# Patient Record
Sex: FEMALE | Race: WHITE | NOT HISPANIC OR LATINO | Employment: PART TIME | URBAN - METROPOLITAN AREA
[De-identification: names, ages, dates, MRNs, and addresses within clinical notes are randomized per-mention and may not be internally consistent; named-entity substitution may affect disease eponyms.]

---

## 2019-01-11 ENCOUNTER — HOSPITAL ENCOUNTER (OUTPATIENT)
Dept: GENERAL RADIOLOGY | Facility: HOSPITAL | Age: 74
Discharge: HOME | End: 2019-01-11
Attending: FAMILY MEDICINE

## 2019-03-13 ENCOUNTER — HOSPITAL ENCOUNTER (OUTPATIENT)
Dept: LAB | Facility: HOSPITAL | Age: 74
Discharge: HOME | End: 2019-03-13

## 2019-11-26 ENCOUNTER — OFFICE VISIT CONVERTED (OUTPATIENT)
Dept: FAMILY MEDICINE CLINIC | Facility: CLINIC | Age: 74
End: 2019-11-26
Attending: NURSE PRACTITIONER

## 2019-11-26 ENCOUNTER — OFFICE VISIT CONVERTED (OUTPATIENT)
Dept: GASTROENTEROLOGY | Facility: CLINIC | Age: 74
End: 2019-11-26
Attending: NURSE PRACTITIONER

## 2020-01-06 ENCOUNTER — OFFICE VISIT CONVERTED (OUTPATIENT)
Dept: SURGERY | Facility: CLINIC | Age: 75
End: 2020-01-06
Attending: SURGERY

## 2020-05-12 ENCOUNTER — OFFICE VISIT CONVERTED (OUTPATIENT)
Dept: FAMILY MEDICINE CLINIC | Facility: CLINIC | Age: 75
End: 2020-05-12
Attending: NURSE PRACTITIONER

## 2020-05-22 ENCOUNTER — HOSPITAL ENCOUNTER (OUTPATIENT)
Dept: NUCLEAR MEDICINE | Facility: HOSPITAL | Age: 75
Discharge: HOME | End: 2020-05-22
Attending: NURSE PRACTITIONER

## 2020-05-22 ENCOUNTER — HOSPITAL ENCOUNTER (OUTPATIENT)
Dept: LAB | Facility: HOSPITAL | Age: 75
Discharge: HOME | End: 2020-05-22
Attending: NURSE PRACTITIONER

## 2020-05-22 LAB
ALBUMIN SERPL-MCNC: 4.3 G/DL (ref 3.5–5)
ALBUMIN/GLOB SERPL: 1.3 {RATIO} (ref 1.4–2.6)
ALP SERPL-CCNC: 71 U/L (ref 43–160)
ALT SERPL-CCNC: 15 U/L (ref 10–40)
ANION GAP SERPL CALC-SCNC: 20 MMOL/L (ref 8–19)
APPEARANCE UR: ABNORMAL
AST SERPL-CCNC: 16 U/L (ref 15–50)
BASOPHILS # BLD AUTO: 0.03 10*3/UL (ref 0–0.2)
BASOPHILS NFR BLD AUTO: 0.3 % (ref 0–3)
BILIRUB SERPL-MCNC: 0.24 MG/DL (ref 0.2–1.3)
BILIRUB UR QL: NEGATIVE
BUN SERPL-MCNC: 18 MG/DL (ref 5–25)
BUN/CREAT SERPL: 18 {RATIO} (ref 6–20)
CALCIUM SERPL-MCNC: 10.3 MG/DL (ref 8.7–10.4)
CHLORIDE SERPL-SCNC: 104 MMOL/L (ref 99–111)
CHOLEST SERPL-MCNC: 152 MG/DL (ref 107–200)
CHOLEST/HDLC SERPL: 2.8 {RATIO} (ref 3–6)
COLOR UR: YELLOW
CONV ABS IMM GRAN: 0.03 10*3/UL (ref 0–0.2)
CONV BACTERIA: ABNORMAL
CONV CO2: 25 MMOL/L (ref 22–32)
CONV COLLECTION SOURCE (UA): ABNORMAL
CONV IMMATURE GRAN: 0.3 % (ref 0–1.8)
CONV TOTAL PROTEIN: 7.5 G/DL (ref 6.3–8.2)
CONV UROBILINOGEN IN URINE BY AUTOMATED TEST STRIP: 0.2 {EHRLICHU}/DL (ref 0.1–1)
CREAT UR-MCNC: 1.01 MG/DL (ref 0.5–0.9)
DEPRECATED RDW RBC AUTO: 48.3 FL (ref 36.4–46.3)
EOSINOPHIL # BLD AUTO: 0.2 10*3/UL (ref 0–0.7)
EOSINOPHIL # BLD AUTO: 1.7 % (ref 0–7)
ERYTHROCYTE [DISTWIDTH] IN BLOOD BY AUTOMATED COUNT: 14.3 % (ref 11.7–14.4)
EST. AVERAGE GLUCOSE BLD GHB EST-MCNC: 126 MG/DL
GFR SERPLBLD BASED ON 1.73 SQ M-ARVRAT: 55 ML/MIN/{1.73_M2}
GLOBULIN UR ELPH-MCNC: 3.2 G/DL (ref 2–3.5)
GLUCOSE SERPL-MCNC: 125 MG/DL (ref 65–99)
GLUCOSE UR QL: NEGATIVE MG/DL
HBA1C MFR BLD: 6 % (ref 3.5–5.7)
HCT VFR BLD AUTO: 42.3 % (ref 37–47)
HDLC SERPL-MCNC: 55 MG/DL (ref 40–60)
HGB BLD-MCNC: 12.9 G/DL (ref 12–16)
HGB UR QL STRIP: ABNORMAL
KETONES UR QL STRIP: NEGATIVE MG/DL
LDLC SERPL CALC-MCNC: 79 MG/DL (ref 70–100)
LEUKOCYTE ESTERASE UR QL STRIP: ABNORMAL
LYMPHOCYTES # BLD AUTO: 3.33 10*3/UL (ref 1–5)
LYMPHOCYTES NFR BLD AUTO: 28.4 % (ref 20–45)
MCH RBC QN AUTO: 27.9 PG (ref 27–31)
MCHC RBC AUTO-ENTMCNC: 30.5 G/DL (ref 33–37)
MCV RBC AUTO: 91.4 FL (ref 81–99)
MONOCYTES # BLD AUTO: 0.67 10*3/UL (ref 0.2–1.2)
MONOCYTES NFR BLD AUTO: 5.7 % (ref 3–10)
NEUTROPHILS # BLD AUTO: 7.45 10*3/UL (ref 2–8)
NEUTROPHILS NFR BLD AUTO: 63.6 % (ref 30–85)
NITRITE UR QL STRIP: NEGATIVE
NRBC CBCN: 0 % (ref 0–0.7)
OSMOLALITY SERPL CALC.SUM OF ELEC: 301 MOSM/KG (ref 273–304)
PH UR STRIP.AUTO: 5 [PH] (ref 5–8)
PLATELET # BLD AUTO: 164 10*3/UL (ref 130–400)
PMV BLD AUTO: 13.1 FL (ref 9.4–12.3)
POTASSIUM SERPL-SCNC: 4.7 MMOL/L (ref 3.5–5.3)
PROT UR QL: 100 MG/DL
RBC # BLD AUTO: 4.63 10*6/UL (ref 4.2–5.4)
RBC #/AREA URNS HPF: ABNORMAL /[HPF]
SODIUM SERPL-SCNC: 144 MMOL/L (ref 135–147)
SP GR UR: 1.02 (ref 1–1.03)
T4 FREE SERPL-MCNC: 1.7 NG/DL (ref 0.9–1.8)
TRIGL SERPL-MCNC: 89 MG/DL (ref 40–150)
TSH SERPL-ACNC: 0.95 M[IU]/L (ref 0.27–4.2)
VLDLC SERPL-MCNC: 18 MG/DL (ref 5–37)
WBC # BLD AUTO: 11.71 10*3/UL (ref 4.8–10.8)
WBC #/AREA URNS HPF: ABNORMAL /[HPF]

## 2020-05-25 LAB
AMOXICILLIN+CLAV SUSC ISLT: 4
AMPICILLIN SUSC ISLT: >=32
AMPICILLIN+SULBAC SUSC ISLT: >=32
BACTERIA UR CULT: ABNORMAL
CEFAZOLIN SUSC ISLT: <=4
CEFEPIME SUSC ISLT: <=1
CEFTAZIDIME SUSC ISLT: <=1
CEFTRIAXONE SUSC ISLT: <=1
CEFUROXIME ORAL SUSC ISLT: 4
CEFUROXIME PARENTER SUSC ISLT: 4
CIPROFLOXACIN SUSC ISLT: 0.5
ERTAPENEM SUSC ISLT: <=0.5
GENTAMICIN SUSC ISLT: <=1
LEVOFLOXACIN SUSC ISLT: 1
NITROFURANTOIN SUSC ISLT: <=16
TETRACYCLINE SUSC ISLT: <=1
TMP SMX SUSC ISLT: <=20
TOBRAMYCIN SUSC ISLT: <=1

## 2020-06-15 ENCOUNTER — HOSPITAL ENCOUNTER (OUTPATIENT)
Dept: OTHER | Facility: HOSPITAL | Age: 75
Discharge: HOME | End: 2020-06-15
Attending: OBSTETRICS & GYNECOLOGY

## 2020-06-25 ENCOUNTER — HOSPITAL ENCOUNTER (OUTPATIENT)
Dept: LAB | Facility: HOSPITAL | Age: 75
Discharge: HOME | End: 2020-06-25
Attending: NURSE PRACTITIONER

## 2020-06-25 LAB
ALBUMIN SERPL-MCNC: 4.7 G/DL (ref 3.5–5)
ALBUMIN/GLOB SERPL: 1.7 {RATIO} (ref 1.4–2.6)
ALP SERPL-CCNC: 68 U/L (ref 43–160)
ALT SERPL-CCNC: 21 U/L (ref 10–40)
ANION GAP SERPL CALC-SCNC: 23 MMOL/L (ref 8–19)
AST SERPL-CCNC: 19 U/L (ref 15–50)
BASOPHILS # BLD AUTO: 0.03 10*3/UL (ref 0–0.2)
BASOPHILS NFR BLD AUTO: 0.4 % (ref 0–3)
BILIRUB SERPL-MCNC: 0.24 MG/DL (ref 0.2–1.3)
BUN SERPL-MCNC: 17 MG/DL (ref 5–25)
BUN/CREAT SERPL: 18 {RATIO} (ref 6–20)
CALCIUM SERPL-MCNC: 10.5 MG/DL (ref 8.7–10.4)
CHLORIDE SERPL-SCNC: 106 MMOL/L (ref 99–111)
CONV ABS IMM GRAN: 0.02 10*3/UL (ref 0–0.2)
CONV CO2: 22 MMOL/L (ref 22–32)
CONV IMMATURE GRAN: 0.2 % (ref 0–1.8)
CONV TOTAL PROTEIN: 7.5 G/DL (ref 6.3–8.2)
CREAT UR-MCNC: 0.93 MG/DL (ref 0.5–0.9)
DEPRECATED RDW RBC AUTO: 47.3 FL (ref 36.4–46.3)
EOSINOPHIL # BLD AUTO: 0.2 10*3/UL (ref 0–0.7)
EOSINOPHIL # BLD AUTO: 2.5 % (ref 0–7)
ERYTHROCYTE [DISTWIDTH] IN BLOOD BY AUTOMATED COUNT: 14.1 % (ref 11.7–14.4)
GFR SERPLBLD BASED ON 1.73 SQ M-ARVRAT: >60 ML/MIN/{1.73_M2}
GLOBULIN UR ELPH-MCNC: 2.8 G/DL (ref 2–3.5)
GLUCOSE SERPL-MCNC: 118 MG/DL (ref 65–99)
HCT VFR BLD AUTO: 43.1 % (ref 37–47)
HGB BLD-MCNC: 13.2 G/DL (ref 12–16)
LYMPHOCYTES # BLD AUTO: 3.65 10*3/UL (ref 1–5)
LYMPHOCYTES NFR BLD AUTO: 45.4 % (ref 20–45)
MCH RBC QN AUTO: 28 PG (ref 27–31)
MCHC RBC AUTO-ENTMCNC: 30.6 G/DL (ref 33–37)
MCV RBC AUTO: 91.3 FL (ref 81–99)
MONOCYTES # BLD AUTO: 0.46 10*3/UL (ref 0.2–1.2)
MONOCYTES NFR BLD AUTO: 5.7 % (ref 3–10)
NEUTROPHILS # BLD AUTO: 3.68 10*3/UL (ref 2–8)
NEUTROPHILS NFR BLD AUTO: 45.8 % (ref 30–85)
NRBC CBCN: 0 % (ref 0–0.7)
OSMOLALITY SERPL CALC.SUM OF ELEC: 305 MOSM/KG (ref 273–304)
PLATELET # BLD AUTO: 183 10*3/UL (ref 130–400)
PMV BLD AUTO: 12.7 FL (ref 9.4–12.3)
POTASSIUM SERPL-SCNC: 4.6 MMOL/L (ref 3.5–5.3)
RBC # BLD AUTO: 4.72 10*6/UL (ref 4.2–5.4)
SODIUM SERPL-SCNC: 146 MMOL/L (ref 135–147)
WBC # BLD AUTO: 8.04 10*3/UL (ref 4.8–10.8)

## 2020-06-26 LAB
EST. AVERAGE GLUCOSE BLD GHB EST-MCNC: 134 MG/DL
HBA1C MFR BLD: 6.3 % (ref 3.5–5.7)

## 2020-06-27 LAB — BACTERIA UR CULT: NORMAL

## 2020-10-29 ENCOUNTER — OFFICE VISIT CONVERTED (OUTPATIENT)
Dept: ORTHOPEDIC SURGERY | Facility: CLINIC | Age: 75
End: 2020-10-29
Attending: ORTHOPAEDIC SURGERY

## 2020-10-30 ENCOUNTER — HOSPITAL ENCOUNTER (OUTPATIENT)
Dept: LAB | Facility: HOSPITAL | Age: 75
Discharge: HOME | End: 2020-10-30
Attending: NURSE PRACTITIONER

## 2020-10-30 LAB
ALBUMIN SERPL-MCNC: 4.7 G/DL (ref 3.5–5)
ALBUMIN/GLOB SERPL: 1.5 {RATIO} (ref 1.4–2.6)
ALP SERPL-CCNC: 77 U/L (ref 43–160)
ALT SERPL-CCNC: 22 U/L (ref 10–40)
ANION GAP SERPL CALC-SCNC: 16 MMOL/L (ref 8–19)
APPEARANCE UR: CLEAR
AST SERPL-CCNC: 18 U/L (ref 15–50)
BASOPHILS # BLD AUTO: 0.03 10*3/UL (ref 0–0.2)
BASOPHILS NFR BLD AUTO: 0.2 % (ref 0–3)
BILIRUB SERPL-MCNC: 0.22 MG/DL (ref 0.2–1.3)
BILIRUB UR QL: NEGATIVE
BUN SERPL-MCNC: 23 MG/DL (ref 5–25)
BUN/CREAT SERPL: 27 {RATIO} (ref 6–20)
CALCIUM SERPL-MCNC: 10.6 MG/DL (ref 8.7–10.4)
CHLORIDE SERPL-SCNC: 103 MMOL/L (ref 99–111)
CHOLEST SERPL-MCNC: 197 MG/DL (ref 107–200)
CHOLEST/HDLC SERPL: 2.7 {RATIO} (ref 3–6)
COLOR UR: YELLOW
CONV ABS IMM GRAN: 0.11 10*3/UL (ref 0–0.2)
CONV BACTERIA: NEGATIVE
CONV CO2: 26 MMOL/L (ref 22–32)
CONV COLLECTION SOURCE (UA): ABNORMAL
CONV HYALINE CASTS IN URINE MICRO: ABNORMAL /[LPF]
CONV IMMATURE GRAN: 0.6 % (ref 0–1.8)
CONV TOTAL PROTEIN: 7.8 G/DL (ref 6.3–8.2)
CONV UROBILINOGEN IN URINE BY AUTOMATED TEST STRIP: 0.2 {EHRLICHU}/DL (ref 0.1–1)
CREAT UR-MCNC: 0.84 MG/DL (ref 0.5–0.9)
DEPRECATED RDW RBC AUTO: 46.6 FL (ref 36.4–46.3)
EOSINOPHIL # BLD AUTO: 0.02 10*3/UL (ref 0–0.7)
EOSINOPHIL # BLD AUTO: 0.1 % (ref 0–7)
ERYTHROCYTE [DISTWIDTH] IN BLOOD BY AUTOMATED COUNT: 14.1 % (ref 11.7–14.4)
EST. AVERAGE GLUCOSE BLD GHB EST-MCNC: 131 MG/DL
GFR SERPLBLD BASED ON 1.73 SQ M-ARVRAT: >60 ML/MIN/{1.73_M2}
GLOBULIN UR ELPH-MCNC: 3.1 G/DL (ref 2–3.5)
GLUCOSE SERPL-MCNC: 124 MG/DL (ref 65–99)
GLUCOSE UR QL: NEGATIVE MG/DL
HBA1C MFR BLD: 6.2 % (ref 3.5–5.7)
HCT VFR BLD AUTO: 44.2 % (ref 37–47)
HDLC SERPL-MCNC: 72 MG/DL (ref 40–60)
HGB BLD-MCNC: 13.8 G/DL (ref 12–16)
HGB UR QL STRIP: NEGATIVE
KETONES UR QL STRIP: NEGATIVE MG/DL
LDLC SERPL CALC-MCNC: 112 MG/DL (ref 70–100)
LEUKOCYTE ESTERASE UR QL STRIP: ABNORMAL
LYMPHOCYTES # BLD AUTO: 2.25 10*3/UL (ref 1–5)
LYMPHOCYTES NFR BLD AUTO: 13.2 % (ref 20–45)
MCH RBC QN AUTO: 28.2 PG (ref 27–31)
MCHC RBC AUTO-ENTMCNC: 31.2 G/DL (ref 33–37)
MCV RBC AUTO: 90.2 FL (ref 81–99)
MONOCYTES # BLD AUTO: 0.55 10*3/UL (ref 0.2–1.2)
MONOCYTES NFR BLD AUTO: 3.2 % (ref 3–10)
NEUTROPHILS # BLD AUTO: 14.05 10*3/UL (ref 2–8)
NEUTROPHILS NFR BLD AUTO: 82.7 % (ref 30–85)
NITRITE UR QL STRIP: NEGATIVE
NRBC CBCN: 0 % (ref 0–0.7)
OSMOLALITY SERPL CALC.SUM OF ELEC: 295 MOSM/KG (ref 273–304)
PH UR STRIP.AUTO: 5.5 [PH] (ref 5–8)
PLATELET # BLD AUTO: 197 10*3/UL (ref 130–400)
PMV BLD AUTO: 11.9 FL (ref 9.4–12.3)
POTASSIUM SERPL-SCNC: 4.9 MMOL/L (ref 3.5–5.3)
PROT UR QL: NEGATIVE MG/DL
RBC # BLD AUTO: 4.9 10*6/UL (ref 4.2–5.4)
RBC #/AREA URNS HPF: ABNORMAL /[HPF]
SODIUM SERPL-SCNC: 140 MMOL/L (ref 135–147)
SP GR UR: 1.02 (ref 1–1.03)
SQUAMOUS SPT QL MICRO: ABNORMAL /[HPF]
TRIGL SERPL-MCNC: 66 MG/DL (ref 40–150)
VLDLC SERPL-MCNC: 13 MG/DL (ref 5–37)
WBC # BLD AUTO: 17.01 10*3/UL (ref 4.8–10.8)
WBC #/AREA URNS HPF: ABNORMAL /[HPF]

## 2020-11-13 ENCOUNTER — OFFICE VISIT CONVERTED (OUTPATIENT)
Dept: FAMILY MEDICINE CLINIC | Facility: CLINIC | Age: 75
End: 2020-11-13
Attending: NURSE PRACTITIONER

## 2020-12-23 ENCOUNTER — HOSPITAL ENCOUNTER (OUTPATIENT)
Dept: LAB | Facility: HOSPITAL | Age: 75
Discharge: HOME | End: 2020-12-23
Attending: NURSE PRACTITIONER

## 2020-12-23 LAB
BASOPHILS # BLD AUTO: 0.03 10*3/UL (ref 0–0.2)
BASOPHILS NFR BLD AUTO: 0.3 % (ref 0–3)
CONV ABS IMM GRAN: 0.03 10*3/UL (ref 0–0.2)
CONV IMMATURE GRAN: 0.3 % (ref 0–1.8)
DEPRECATED RDW RBC AUTO: 47.5 FL (ref 36.4–46.3)
EOSINOPHIL # BLD AUTO: 0.17 10*3/UL (ref 0–0.7)
EOSINOPHIL # BLD AUTO: 1.7 % (ref 0–7)
ERYTHROCYTE [DISTWIDTH] IN BLOOD BY AUTOMATED COUNT: 14.2 % (ref 11.7–14.4)
HCT VFR BLD AUTO: 44.2 % (ref 37–47)
HGB BLD-MCNC: 13.7 G/DL (ref 12–16)
LYMPHOCYTES # BLD AUTO: 3.09 10*3/UL (ref 1–5)
LYMPHOCYTES NFR BLD AUTO: 31.4 % (ref 20–45)
MCH RBC QN AUTO: 28 PG (ref 27–31)
MCHC RBC AUTO-ENTMCNC: 31 G/DL (ref 33–37)
MCV RBC AUTO: 90.4 FL (ref 81–99)
MONOCYTES # BLD AUTO: 0.54 10*3/UL (ref 0.2–1.2)
MONOCYTES NFR BLD AUTO: 5.5 % (ref 3–10)
NEUTROPHILS # BLD AUTO: 5.99 10*3/UL (ref 2–8)
NEUTROPHILS NFR BLD AUTO: 60.8 % (ref 30–85)
NRBC CBCN: 0 % (ref 0–0.7)
PLATELET # BLD AUTO: 206 10*3/UL (ref 130–400)
PMV BLD AUTO: 12 FL (ref 9.4–12.3)
RBC # BLD AUTO: 4.89 10*6/UL (ref 4.2–5.4)
WBC # BLD AUTO: 9.85 10*3/UL (ref 4.8–10.8)

## 2020-12-28 ENCOUNTER — HOSPITAL ENCOUNTER (OUTPATIENT)
Dept: LAB | Facility: HOSPITAL | Age: 75
Discharge: HOME | End: 2020-12-28
Attending: NURSE PRACTITIONER

## 2020-12-28 LAB
AMYLASE SERPL-CCNC: 54 U/L (ref 30–150)
LIPASE SERPL-CCNC: 24 U/L (ref 5–51)

## 2021-02-03 ENCOUNTER — HOSPITAL ENCOUNTER (OUTPATIENT)
Dept: VACCINE CLINIC | Facility: HOSPITAL | Age: 76
Discharge: HOME | End: 2021-02-03
Attending: INTERNAL MEDICINE

## 2021-02-23 ENCOUNTER — OFFICE VISIT CONVERTED (OUTPATIENT)
Dept: GASTROENTEROLOGY | Facility: CLINIC | Age: 76
End: 2021-02-23
Attending: NURSE PRACTITIONER

## 2021-02-23 ENCOUNTER — CONVERSION ENCOUNTER (OUTPATIENT)
Dept: GASTROENTEROLOGY | Facility: CLINIC | Age: 76
End: 2021-02-23

## 2021-03-05 ENCOUNTER — HOSPITAL ENCOUNTER (OUTPATIENT)
Dept: VACCINE CLINIC | Facility: HOSPITAL | Age: 76
Discharge: HOME | End: 2021-03-05
Attending: INTERNAL MEDICINE

## 2021-03-08 ENCOUNTER — HOSPITAL ENCOUNTER (OUTPATIENT)
Dept: NUCLEAR MEDICINE | Facility: HOSPITAL | Age: 76
Discharge: HOME | End: 2021-03-08
Attending: NURSE PRACTITIONER

## 2021-04-30 ENCOUNTER — HOSPITAL ENCOUNTER (OUTPATIENT)
Dept: LAB | Facility: HOSPITAL | Age: 76
Discharge: HOME | End: 2021-04-30
Attending: NURSE PRACTITIONER

## 2021-04-30 ENCOUNTER — OFFICE VISIT CONVERTED (OUTPATIENT)
Dept: FAMILY MEDICINE CLINIC | Facility: CLINIC | Age: 76
End: 2021-04-30
Attending: NURSE PRACTITIONER

## 2021-04-30 LAB
ALBUMIN SERPL-MCNC: 4.5 G/DL (ref 3.5–5)
ALBUMIN/GLOB SERPL: 1.4 {RATIO} (ref 1.4–2.6)
ALP SERPL-CCNC: 65 U/L (ref 43–160)
ALT SERPL-CCNC: 23 U/L (ref 10–40)
AMYLASE SERPL-CCNC: 60 U/L (ref 30–150)
ANION GAP SERPL CALC-SCNC: 20 MMOL/L (ref 8–19)
APPEARANCE UR: CLEAR
AST SERPL-CCNC: 20 U/L (ref 15–50)
BASOPHILS # BLD AUTO: 0.03 10*3/UL (ref 0–0.2)
BASOPHILS NFR BLD AUTO: 0.3 % (ref 0–3)
BILIRUB SERPL-MCNC: 0.33 MG/DL (ref 0.2–1.3)
BILIRUB UR QL: NEGATIVE
BUN SERPL-MCNC: 20 MG/DL (ref 5–25)
BUN/CREAT SERPL: 16 {RATIO} (ref 6–20)
CALCIUM SERPL-MCNC: 11 MG/DL (ref 8.7–10.4)
CHLORIDE SERPL-SCNC: 103 MMOL/L (ref 99–111)
CHOLEST SERPL-MCNC: 172 MG/DL (ref 107–200)
CHOLEST/HDLC SERPL: 2.9 {RATIO} (ref 3–6)
COLOR UR: YELLOW
CONV ABS IMM GRAN: 0.04 10*3/UL (ref 0–0.2)
CONV CO2: 24 MMOL/L (ref 22–32)
CONV COLLECTION SOURCE (UA): ABNORMAL
CONV HYALINE CASTS IN URINE MICRO: ABNORMAL /[LPF]
CONV IMMATURE GRAN: 0.4 % (ref 0–1.8)
CONV TOTAL PROTEIN: 7.8 G/DL (ref 6.3–8.2)
CONV UROBILINOGEN IN URINE BY AUTOMATED TEST STRIP: 0.2 {EHRLICHU}/DL (ref 0.1–1)
CREAT UR-MCNC: 1.26 MG/DL (ref 0.5–0.9)
DEPRECATED RDW RBC AUTO: 48 FL (ref 36.4–46.3)
EOSINOPHIL # BLD AUTO: 0.21 10*3/UL (ref 0–0.7)
EOSINOPHIL # BLD AUTO: 1.9 % (ref 0–7)
ERYTHROCYTE [DISTWIDTH] IN BLOOD BY AUTOMATED COUNT: 14.4 % (ref 11.7–14.4)
GFR SERPLBLD BASED ON 1.73 SQ M-ARVRAT: 42 ML/MIN/{1.73_M2}
GLOBULIN UR ELPH-MCNC: 3.3 G/DL (ref 2–3.5)
GLUCOSE SERPL-MCNC: 122 MG/DL (ref 65–99)
GLUCOSE UR QL: NEGATIVE MG/DL
HCT VFR BLD AUTO: 40.7 % (ref 37–47)
HDLC SERPL-MCNC: 59 MG/DL (ref 40–60)
HGB BLD-MCNC: 12.6 G/DL (ref 12–16)
HGB UR QL STRIP: NEGATIVE
KETONES UR QL STRIP: ABNORMAL MG/DL
LDLC SERPL CALC-MCNC: 94 MG/DL (ref 70–100)
LEUKOCYTE ESTERASE UR QL STRIP: ABNORMAL
LIPASE SERPL-CCNC: 22 U/L (ref 5–51)
LYMPHOCYTES # BLD AUTO: 2.86 10*3/UL (ref 1–5)
LYMPHOCYTES NFR BLD AUTO: 25.4 % (ref 20–45)
MCH RBC QN AUTO: 28.1 PG (ref 27–31)
MCHC RBC AUTO-ENTMCNC: 31 G/DL (ref 33–37)
MCV RBC AUTO: 90.6 FL (ref 81–99)
MONOCYTES # BLD AUTO: 0.76 10*3/UL (ref 0.2–1.2)
MONOCYTES NFR BLD AUTO: 6.8 % (ref 3–10)
NEUTROPHILS # BLD AUTO: 7.35 10*3/UL (ref 2–8)
NEUTROPHILS NFR BLD AUTO: 65.2 % (ref 30–85)
NITRITE UR QL STRIP: NEGATIVE
NRBC CBCN: 0 % (ref 0–0.7)
OSMOLALITY SERPL CALC.SUM OF ELEC: 296 MOSM/KG (ref 273–304)
PH UR STRIP.AUTO: 5.5 [PH] (ref 5–8)
PLATELET # BLD AUTO: 213 10*3/UL (ref 130–400)
PMV BLD AUTO: 12.6 FL (ref 9.4–12.3)
POTASSIUM SERPL-SCNC: 5.5 MMOL/L (ref 3.5–5.3)
PROT UR QL: ABNORMAL MG/DL
RBC # BLD AUTO: 4.49 10*6/UL (ref 4.2–5.4)
RBC #/AREA URNS HPF: ABNORMAL /[HPF]
SODIUM SERPL-SCNC: 141 MMOL/L (ref 135–147)
SP GR UR: 1.02 (ref 1–1.03)
SQUAMOUS SPT QL MICRO: ABNORMAL /[HPF]
T4 FREE SERPL-MCNC: 1.6 NG/DL (ref 0.9–1.8)
TRIGL SERPL-MCNC: 95 MG/DL (ref 40–150)
TSH SERPL-ACNC: 0.63 M[IU]/L (ref 0.27–4.2)
VLDLC SERPL-MCNC: 19 MG/DL (ref 5–37)
WBC # BLD AUTO: 11.25 10*3/UL (ref 4.8–10.8)
WBC #/AREA URNS HPF: ABNORMAL /[HPF]

## 2021-05-10 NOTE — H&P
History and Physical      Patient Name: Bhakti Travis   Patient ID: [id number]   Sex: Female   YOB: 1945    Primary Care Provider: Zaid AUSTIN   Referring Provider: Lissy AUSTIN    Visit Date: October 29, 2020    Provider: Yvonne Davison MD   Location: [facility]   Location Address: [address] [place] [address]  [id number]   Location Phone: [phone]          Chief Complaint  · Left shoulder pain      History Of Present Illness  Bhakti Travis is a 75 year old /White female who presents today to [facility].      Patient presents today with a chief complaint of left shoulder pain. Patient has had shoulder pain on and off but lately the pain has increasingly got worse and more consistent. Patient denies any trauma or injury to her shoulder. Patient works as a dialysis nurse.       Past Medical History  Essential hypertension; High blood pressure; High cholesterol; Hypothyroidism; Kidney stones; Pancreatitis         Past Surgical History  Appendectomy         Medication List  amlodipine 5 mg oral tablet; cefdinir 300 mg oral capsule; lisinopril 20 mg oral tablet; Protonix 40 mg oral tablet,delayed release (DR/EC); simvastatin 20 mg oral tablet; Synthroid 100 mcg oral tablet; Voltaren 1 % topical gel         Allergy List  NO KNOWN DRUG ALLERGIES       Allergies Reconciled  Family Medical History  Family history of breast cancer         Social History  Alcohol (Never); lives alone; Retired; Single; Tobacco (Former)         Immunizations  Name Date Admin   Influenza 10/01/2019   Zifaqplju05 10/01/2016   Prevnar 13 10/01/2017         Review of Systems  · Constitutional  o Denies  o : fever, chills, weight loss  · Cardiovascular  o Denies  o : chest pain, shortness of breath  · Gastrointestinal  o Denies  o : liver disease, heartburn, nausea, blood in stools  · Genitourinary  o Denies  o : painful urination, blood in  "urine  · Integument  o Denies  o : rash, itching  · Neurologic  o Denies  o : headache, weakness, loss of consciousness  · Musculoskeletal  o Denies  o : painful, swollen joints  · Psychiatric  o Denies  o : drug/alcohol addiction, anxiety, depression      Vitals  Date Time BP Position Site L\R Cuff Size HR RR TEMP (F) WT  HT  BMI kg/m2 BSA m2 O2 Sat FR L/min FiO2        10/29/2020 09:44 AM      82 - R   202lbs 0oz 5'  4\" 34.67 2.03 96 %            Physical Examination  · Constitutional  o Appearance  o : well developed, well-nourished, no obvious deformities present  · Head and Face  o Head  o :   § Inspection  § : normocephalic  o Face  o :   § Inspection  § : no facial lesions  · Eyes  o Conjunctivae  o : conjunctivae normal  o Sclerae  o : sclerae white  · Ears, Nose, Mouth and Throat  o Ears  o :   § External Ears  § : appearance within normal limits  § Hearing  § : intact  o Nose  o :   § External Nose  § : appearance normal  · Neck  o Inspection/Palpation  o : normal appearance  o Range of Motion  o : full range of motion  · Respiratory  o Respiratory Effort  o : breathing unlabored  o Inspection of Chest  o : normal appearance  o Auscultation of Lungs  o : no audible wheezing or rales  · Cardiovascular  o Heart  o : regular rate  · Gastrointestinal  o Abdominal Examination  o : soft and non-tender  · Skin and Subcutaneous Tissue  o General Inspection  o : intact, no rashes  · Psychiatric  o General  o : Alert and oriented x3  o Judgement and Insight  o : judgment and insight intact  o Mood and Affect  o : mood normal, affect appropriate  · Left Shoulder  o Inspection  o : Sensation grossly intact. Neurovascular intact. Skin intact. No swelling, skin discoloration or atrophy. Positive impingement test. Good tone of deltoid, biceps, triceps, wrist extensors, and wrist flexors. Full ROM. Tender to palpation.   · Injection Note/Aspiration Note  o Site  o : left shoulder   o Procedure  o : Procedure: After " educating the patient, patient gave consent for procedure. After using Chloraprep, the joint space was injected. The patient tolerated the procedure well.   o Medication  o : 80 mg of DepoMedrol with 9cc of 1% Lidocaine  · In Office Procedures  o View  o : AP/LATERAL  o Site  o : left, shoulder   o Indication  o : Left shoulder pain   o Study  o : X-rays ordered, taken in the office, and reviewed today.  o Xray  o : Mild arthritic spurring. Negative signs for fracture or dislocation.           Assessment  · Pain: Shoulder     719.41/M25.519  · Shoulder impingement syndrome, left     726.2/M75.42      Plan  · Orders  o Depo-Medrol injection 80mg () - 719.41/M25.519 - 10/29/2020   Lot [id number] manufactured by [employer] 09 2021  o Shoulder Intra-articular Injection without US Guidance [facility] (24633) - 719.41/M25.519 - 10/29/2020   Lot [id number] manufactured by [employer] 08 2021 Administered by Yvonne Davison MD  o Scapula (Left) [facility] ([id number]) - 719.41/M25.519 - 10/29/2020  · Medications  o Medications have been Reconciled  o Transition of Care or Provider Policy  · Instructions  o Dr. Davison saw and examined the patient and agrees with plan.   o X-rays reviewed by Dr. Davison.  o Reviewed the patient's Past Medical, Social, and Family history as well as the ROS at today's visit, no changes.  o Call or return if worsening symptoms.  o Exercise handout given.  o Follow Up PRN.  o This note was transcribed by Maria C Burkett.   o Discussed diagnosis and treatment options with the patient. Patient opted for an injection and tolerated it well. Patient also opted for some PT and home exercises.             Electronically Signed by: Maria C Burkett-, Other -Author on October 30, 2020 10:39:21 AM  Electronically Co-signed by: Yvonne Davison MD -Reviewer on November 1, 2020 02:45:03 PM

## 2021-05-10 NOTE — H&P
History and Physical      Patient Name: Bhakti Travis   Patient ID: [id number]   Sex: Female   YOB: 1945    Primary Care Provider: Zaid AUSTIN   Referring Provider: Sammi Andrade MD    Visit Date: February 23, 2021    Provider: GEOVANNA Bennett   Location: [facility]   Location Address: [address] [place] [address], [address]  [place], [place]  [id number]   Location Phone: [phone]          Chief Complaint  · Abdominal pain      History Of Present Illness  The patient is a 75 year old /White female who presents on referral from Zaid AUSTIN for a gastroenterology evaluation.      She presents for evaluation of nausea, abdominal pain and history of pancreatitis.  She was previously followed by Dr. Willis.     Previous records reviewed:  EUS performed 11/8/2019.  GE junction biopsy-normal, gastric biopsy-intestinal metaplasia and chronic inflammation.    12/23/2020 CBC: Hemoglobin 13.7, hematocrit 44.2, platelets 206.  Amylase 54, lipase 24.  10/30/2020 CMP: Creatinine 0.84, alk phos 77, ALT 22, AST 18, total bilirubin 0.22.    Reports no episodes of pancreatitis since 2019.  Reports that she occasionally notices discomfort in epigastric region.  She then follows a liquid diet for a day and states that it resolves.  Also notes four episodes of nausea following meals.  States that she's unsure of the types of foods that are bothering her stomach.  Denies heartburn and dysphagia.      She's using mirlalax daily and reports a daily bowel movement . Denies rectal bleeding.      Previous colonoscopy performed 11/30/2016 - Diverticulosis in the sigmoid colon, otherwise normal colonoscopy.    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       Past Medical History  Essential hypertension; High blood pressure; High cholesterol; Hyperlipemia; Hypertension; Hypothyroidism; Hypothyroidism; Kidney stones;  "Pancreatitis         Past Surgical History  Appendectomy; Back; Colonoscopy         Medication List  amlodipine 10 mg oral tablet; hydrochlorothiazide 25 mg oral tablet; lisinopril 20 mg oral tablet; Protonix 40 mg oral tablet,delayed release (DR/EC); simvastatin 20 mg oral tablet; Synthroid 100 mcg oral tablet; Voltaren 1 % topical gel         Allergy List  NO KNOWN DRUG ALLERGIES       Allergies Reconciled  Family Medical History  Heart Disease; Cancer, Unspecified; Family history of breast cancer; Family history of stomach cancer         Social History  Alcohol (Never); Alcohol Use (Never); Homemaker.; lives alone; Recreational Drug Use (Never); Retired; Single; Tobacco (Former); ; Working         Immunizations  Name Date Admin   COVID Moderna 02/03/2021   Influenza 10/01/2020   Influenza 10/01/2019   Vkvpywflz54 10/01/2016   Prevnar 13 10/01/2017         Review of Systems  · Constitutional  o Denies  o : chills, fever  · Eyes  o Denies  o : blurred vision, changes in vision  · Cardiovascular  o Denies  o : chest pain, irregular heart beats  · Respiratory  o Denies  o : shortness of breath, cough  · Gastrointestinal  o Admits  o : See HPI  · Genitourinary  o Denies  o : dysuria, blood in urine  · Integument  o Denies  o : rash, new skin lesions  · Neurologic  o Denies  o : tingling or numbness, seizures  · Musculoskeletal  o Denies  o : joint pain, joint swelling  · Endocrine  o Denies  o : weight gain, weight loss  · Psychiatric  o Denies  o : anxiety, depression      Vitals  Date Time BP Position Site L\R Cuff Size HR RR TEMP (F) WT  HT  BMI kg/m2 BSA m2 O2 Sat FR L/min FiO2 HC       02/23/2021 02:03 /75 Sitting    77 - R   203lbs 2oz 5'  4\" 34.87 2.04             Physical Examination  · Constitutional  o Appearance  o : well developed, well-nourished, in no acute distress  · Eyes  o Vision  o :   § Visual Fields  § : eyes move symmetrical in all directions  o Sclerae  o : anicteric  o Pupils and " Irises  o : pupils equal and symmetrical  · Neck  o Inspection/Palpation  o : supple  · Respiratory  o Respiratory Effort  o : breathing unlabored  o Inspection of Chest  o : normal appearance, no retractions  o Auscultation of Lungs  o : clear to auscultation bilaterally  · Cardiovascular  o Heart  o :   § Auscultation of Heart  § : no murmurs, gallops or rubs  · Gastrointestinal  o Abdominal Examination  o : soft, nontender to palpation, with normal active bowel sounds, no appreciable hepatosplenomegaly  o Digital Rectal Exam  o : deferred  · Lymphatic  o Neck  o : no palpable lymphadenopathy  · Skin and Subcutaneous Tissue  o General Inspection  o : without focal lesions; turgor is normal  · Psychiatric  o General  o : Alert and oriented x3  o Mood and Affect  o : Mood and affect are appropriate to circumstances  · Extremities  o Extremities  o : No edema, no cyanosis          Assessment  · Pre-op testing     V72.84/Z01.818  · Epigastric pain     789.06/R10.13  · Nausea alone     787.02/R11.0  · Intestinal metaplasia of gastric cardia     537.89/K31.89  · Family history of stomach cancer     V16.0/Z80.0  · History of pancreatitis     V12.79/Z87.19      Plan  · Orders  o [facility] Pre-Op Covid-19 Screening (61415) - - 02/23/2021  o Consent for Esophagogastroduodenoscopy (EGD) - Possible risks/complications, benefits, and alternatives to surgical or invasive procedure have been explained to patient and/or legal guardian. - Patient has been evaluated and can tolerate anesthesia and/or sedation. Risks, benefits, and alternatives to anesthesia and sedation have been explained to patient and/or legal guardian. (11026) - - 02/23/2021  o HIDA scan (00483) - - 02/23/2021  · Medications  o Medications have been Reconciled  · Instructions  o Handouts provided: Pre-procedure instructions including date and time and location of procedure.  o PLAN: Proceed with procedure. Patient understands risks and benefits and is willing to  proceed. Understands the risks include, but are not limited to, bleeding and/or perforation.  o Information given on current diagnoses.  o Electronically Identified Patient Education Materials Provided Electronically  · Disposition  o Follow Up after procedure            Electronically Signed by: GEOVANNA Bennett -Author on February 23, 2021 02:32:07 PM

## 2021-05-12 ENCOUNTER — HOSPITAL ENCOUNTER (OUTPATIENT)
Dept: LAB | Facility: HOSPITAL | Age: 76
Discharge: HOME | End: 2021-05-12
Attending: NURSE PRACTITIONER

## 2021-05-12 LAB
ALBUMIN SERPL-MCNC: 4.4 G/DL (ref 3.5–5)
ALBUMIN/GLOB SERPL: 1.2 {RATIO} (ref 1.4–2.6)
ALP SERPL-CCNC: 71 U/L (ref 43–160)
ALT SERPL-CCNC: 21 U/L (ref 10–40)
ANION GAP SERPL CALC-SCNC: 18 MMOL/L (ref 8–19)
AST SERPL-CCNC: 16 U/L (ref 15–50)
BASOPHILS # BLD AUTO: 0.02 10*3/UL (ref 0–0.2)
BASOPHILS NFR BLD AUTO: 0.2 % (ref 0–3)
BILIRUB SERPL-MCNC: 0.2 MG/DL (ref 0.2–1.3)
BUN SERPL-MCNC: 19 MG/DL (ref 5–25)
BUN/CREAT SERPL: 18 {RATIO} (ref 6–20)
CALCIUM SERPL-MCNC: 10.3 MG/DL (ref 8.7–10.4)
CHLORIDE SERPL-SCNC: 107 MMOL/L (ref 99–111)
CONV ABS IMM GRAN: 0.03 10*3/UL (ref 0–0.2)
CONV CO2: 24 MMOL/L (ref 22–32)
CONV IMMATURE GRAN: 0.3 % (ref 0–1.8)
CONV TOTAL PROTEIN: 8 G/DL (ref 6.3–8.2)
CREAT UR-MCNC: 1.08 MG/DL (ref 0.5–0.9)
DEPRECATED RDW RBC AUTO: 46.9 FL (ref 36.4–46.3)
EOSINOPHIL # BLD AUTO: 0.21 10*3/UL (ref 0–0.7)
EOSINOPHIL # BLD AUTO: 2.1 % (ref 0–7)
ERYTHROCYTE [DISTWIDTH] IN BLOOD BY AUTOMATED COUNT: 14.5 % (ref 11.7–14.4)
EST. AVERAGE GLUCOSE BLD GHB EST-MCNC: 131 MG/DL
GFR SERPLBLD BASED ON 1.73 SQ M-ARVRAT: 50 ML/MIN/{1.73_M2}
GLOBULIN UR ELPH-MCNC: 3.6 G/DL (ref 2–3.5)
GLUCOSE SERPL-MCNC: 104 MG/DL (ref 65–99)
HBA1C MFR BLD: 6.2 % (ref 3.5–5.7)
HCT VFR BLD AUTO: 40.4 % (ref 37–47)
HGB BLD-MCNC: 13.2 G/DL (ref 12–16)
LYMPHOCYTES # BLD AUTO: 3.54 10*3/UL (ref 1–5)
LYMPHOCYTES NFR BLD AUTO: 34.8 % (ref 20–45)
MCH RBC QN AUTO: 29.1 PG (ref 27–31)
MCHC RBC AUTO-ENTMCNC: 32.7 G/DL (ref 33–37)
MCV RBC AUTO: 89 FL (ref 81–99)
MONOCYTES # BLD AUTO: 0.51 10*3/UL (ref 0.2–1.2)
MONOCYTES NFR BLD AUTO: 5 % (ref 3–10)
NEUTROPHILS # BLD AUTO: 5.85 10*3/UL (ref 2–8)
NEUTROPHILS NFR BLD AUTO: 57.6 % (ref 30–85)
NRBC CBCN: 0 % (ref 0–0.7)
OSMOLALITY SERPL CALC.SUM OF ELEC: 301 MOSM/KG (ref 273–304)
PLATELET # BLD AUTO: 211 10*3/UL (ref 130–400)
PMV BLD AUTO: 12.3 FL (ref 9.4–12.3)
POTASSIUM SERPL-SCNC: 4.7 MMOL/L (ref 3.5–5.3)
RBC # BLD AUTO: 4.54 10*6/UL (ref 4.2–5.4)
SODIUM SERPL-SCNC: 144 MMOL/L (ref 135–147)
WBC # BLD AUTO: 10.16 10*3/UL (ref 4.8–10.8)

## 2021-05-13 NOTE — PROGRESS NOTES
Progress Note      Patient Name: Bhakti Travis   Patient ID: [id number]   Sex: Female   YOB: 1945    Primary Care Provider: Zaid AUSTIN   Referring Provider: Lissy AUSTIN    Visit Date: May 12, 2020    Provider: GEOVANNA Bajwa   Location: [facility]   Location Address: [address] [place] [address], [address] [place] [address]  [id number]   Location Phone: [phone]          Chief Complaint     The patient is here for a f/u of hypothyroid, GERD, htn, and hyperlipidemia.  She has been having mid back pain that has gone away after stopping eating greasy food.  She has thigh pain at night and she has some vaginal issues she wants to go to OB/Gyn.       History Of Present Illness  Bhakti Travis is a 75 year old /White female who presents for evaluation and treatment of:      follow up.    HTN: doing well on medication, BP normally runs 140/70-80.    Hyperlipidemia: doing well on medication.    Hypothyroid: doing well on medication.    Had a gallbladder flare last week with mid back pain.  She stopped eating greasy foods, dairy, and coffee and symptoms went away.  She seen Dr. Lawson in January for possible surgery but she never scheduled the surgery.    c/o bilateral thigh pain at night for 3 nights last week.  Pain was relieved with Aleeve.  Denies any numbness or tingling.  She states she was constipated d/t not taking Miralax and since restarted Miralax the thigh pain went away.    c/o 2 lesions on labia that has been there for 2 years.  Denies any pain, erythema, or drainage.  States had genital warts removed approximately 50 years ago.  Would like referral to OBGYN.    Patient denies Common Covid symptoms:  headache, fever of at least 100 degrees, congestion, shortness of breath, N&V, abdominal pain, loss of taste or smell, chills or muscle aches.       Past Medical History  Disease Name Date Onset Notes   Essential hypertension --  --    High blood pressure --   --    High cholesterol --  --    Hypothyroidism --  --    Kidney stones --  --    Pancreatitis --  --          Past Surgical History  Procedure Name Date Notes   Appendectomy --  --          Medication List  Name Date Started Instructions   amlodipine 5 mg oral tablet 05/12/2020 take 1 tablet (5 mg) by oral route once daily for 90 days   lisinopril 20 mg oral tablet 05/12/2020 take 1 tablet by oral route 2 times a day for 90 days   Protonix 40 mg oral tablet,delayed release (DR/EC) 05/12/2020 take 1 tablet (40 mg) by oral route once daily for 90 days   simvastatin 20 mg oral tablet 05/12/2020 take 1 tablet (20 mg) by oral route once daily in the evening for 90 days   Synthroid 100 mcg oral tablet 05/12/2020 take 1 tablet (100 mcg) by oral route once daily for 90 days         Allergy List  Allergen Name Date Reaction Notes   NO KNOWN DRUG ALLERGIES --  --  --          Family Medical History  Disease Name Relative/Age Notes   Family history of breast cancer Mother/40s   Mother/40s  Mother         Social History  Finding Status Start/Stop Quantity Notes   Alcohol Never --/-- --  does not drink   lives alone --  --/-- --  --    Retired --  --/-- --  --    Single --  --/-- --  --    Tobacco Former 20/49 1ppd former smoker         Immunizations  NameDate Admin Mfg Trade Name Lot Number Route Inj VIS Given VIS Publication   Eortvlyvc38/01/2019 SKB Fluarix, quadrivalent, preservative free [id number] NE NE 11/26/2019    Comments:    Knvimwwtv1394/01/2016 NE Not Entered  NE NE 11/26/2019    Comments:    Prevnar 1310/01/2017 NE Not Entered  NE NE 11/26/2019    Comments:          Review of Systems  · Constitutional  o Denies  o : fever, fatigue, weight loss, weight gain  · Cardiovascular  o Denies  o : lower extremity edema, claudication, chest pressure, palpitations  · Respiratory  o Denies  o : shortness of breath, wheezing, cough, hemoptysis, dyspnea on exertion  · Gastrointestinal  o Denies  o : nausea, vomiting, diarrhea,  "constipation, abdominal pain  · Genitourinary  o Admits  o : genital sores  · Musculoskeletal  o Admits  o : muscle pain, back pain      Vitals  Date Time BP Position Site L\R Cuff Size HR RR TEMP (F) WT  HT  BMI kg/m2 BSA m2 O2 Sat HC       05/12/2020 01:55 /70 Sitting    71 - R 16 98.3 201lbs 0oz 5'  4\" 34.5 2.03 92 %          Physical Examination  · Constitutional  o Appearance  o : well-nourished, well developed, alert, in no acute distress  · Eyes  o Conjunctivae  o : conjunctivae normal  o Sclerae  o : sclerae white  o Pupils and Irises  o : pupils equal, round, and reactive to light and accommodation bilaterally  o Corneas  o : tear film normal, no lesions present  o Eyelids/Ocular Adnexae  o : eyelid appearance normal, no exudates present, eye moisture level normal  · Neck  o Inspection/Palpation  o : normal appearance, no masses or tenderness, trachea midline, no enlarged cervical or supraclavicular lymphnodes palpated  o Thyroid  o : gland size normal, nontender, no nodules or masses present on palpation, thyroid motion normal during swallowing  · Respiratory  o Respiratory Effort  o : breathing unlabored  o Inspection of Chest  o : normal appearance, no retractions  o Auscultation of Lungs  o : normal breath sounds throughout  · Cardiovascular  o Heart  o :   § Auscultation of Heart  § : regular rate and rhythm without murmur, PMI normal  o Peripheral Vascular System  o :   § Carotid Arteries  § : normal pulses bilaterally, no bruits present  § Pedal Pulses  § : pulses 2 bilaterally  § Extremities  § : no cyanosis, clubbing or edema; less than 2 second refill noted  · Musculoskeletal  o General  o : No joint swelling or deformity noted. Muscle tone, strength and development grossly normal.  · Skin and Subcutaneous Tissue  o General Inspection  o : no rashes or lesions present, no areas of discoloration  · Neurologic  o Mental Status Examination  o : judgement, insight intact, modd and affect " appropriate  o Motor Examination  o : strength grossly intact in all four extremities  o Gait and Station  o : normal gait, able to stand without difficulty          Assessment  · Essential hypertension     401.9/I10  · Hyperlipidemia     272.4/E78.5  · Hypothyroidism     244.9/E03.9  · Impaired fasting glucose     790.21/R73.01  · Genital lesion, female     629.89/N94.9      Plan  · Orders  o HTN/Lipid Panel (CMP, Lipid) [facility] (97350, 11175) - 401.9/I10 - 05/12/2020  o CBC with Auto Diff [facility] (16883) - 401.9/I10 - 05/12/2020  o Urinalysis with Reflex Microscopy if abnormal ([facility]) (37393) - 401.9/I10 - 05/12/2020  o Thyroid Profile (17883, 12975, THYII) - 244.9/E03.9 - 05/12/2020  o Hgb A1c [facility] (25268) - 790.21/R73.01 - 05/12/2020  o ACO-39: Current medications updated and reviewed () - - 05/12/2020  o ACO-14: Influenza immunization administered or previously received () - - 05/12/2020  o OB/GYN CONSULTATION (OBGYN) - 629.89/N94.9 - 05/12/2020   Dr. Moni Rutherford  · Medications  o amlodipine 5 mg oral tablet   SIG: take 1 tablet (5 mg) by oral route once daily for 90 days   DISP: (90) tablet with 1 refills  Refilled on 05/12/2020     o lisinopril 20 mg oral tablet   SIG: take 1 tablet by oral route 2 times a day for 90 days   DISP: (180) tablet with 1 refills  Refilled on 05/12/2020     o Protonix 40 mg oral tablet,delayed release (/EC)   SIG: take 1 tablet (40 mg) by oral route once daily for 90 days   DISP: (90) tablet with 1 refills  Refilled on 05/12/2020     o simvastatin 20 mg oral tablet   SIG: take 1 tablet (20 mg) by oral route once daily in the evening for 90 days   DISP: (90) tablet with 1 refills  Refilled on 05/12/2020     o Synthroid 100 mcg oral tablet   SIG: take 1 tablet (100 mcg) by oral route once daily for 90 days   DISP: (90) tablet with 1 refills  Refilled on 05/12/2020     · Instructions  o Advised that cheeses and other sources of dairy fats, animal fats, fast food, and the extras  (candy, pastries, pies, doughnuts and cookies) all contain LDL raising nutrients. Advised to increase fruits, vegetables, whole grains, and to monitor portion sizes.   o Patient was educated/instructed on their diagnosis, treatment and medications prior to discharge from the clinic today.  o Minutes spent with patient including greater than 50% in Education/Counseling/Care Coordination.  o Time spent with the patient was minutes, more than 50% face to face.  · Disposition  o Return in 6 months            Electronically Signed by: GEOVANNA Bajwa -Author on May 12, 2020 03:53:50 PM

## 2021-05-13 NOTE — PROGRESS NOTES
Progress Note      Patient Name: Bhakti Travis   Patient ID: [id number]   Sex: Female   YOB: 1945    Primary Care Provider: Zaid AUSTIN   Referring Provider: Lissy AUSTIN    Visit Date: November 13, 2020    Provider: GEOVANNA Bajwa   Location: [facility]   Location Address: [address] [place] [address], [address] [place] [address]  [id number]   Location Phone: [phone]          Chief Complaint     The patient is here for a f/u of htn, GERD, hyperlipidemia, hypothyroid.       History Of Present Illness  Bhakti Travis is a 75 year old /White female who presents for evaluation and treatment of:      Hypertension:  134/56 today  WBC elevated.  May be from steroid injection.    GERD:  doing well on medication.    Hypothyroid:  doing well on medication    diabetes: A1C% 6.2.  Well controlled with diet.    Feel hurts but has flat feet.  knows need to lose weight.  Has been testing for Covid for her travel.           Past Medical History  Disease Name Date Onset Notes   Essential hypertension --  --    High blood pressure --  --    High cholesterol --  --    Hyperlipemia --  --    Hypertension --  --    Hypothyroidism --  --    Kidney stones --  --    Pancreatitis --  --          Past Surgical History  Procedure Name Date Notes   Appendectomy --  --    Back --  --    Colonoscopy --  --          Medication List  Name Date Started Instructions   amlodipine 5 mg oral tablet 10/01/2020 take 1 tablet (5 mg) by oral route once daily for 90 days   cefdinir 300 mg oral capsule 05/26/2020 take 1 capsule by oral route 2 times a day for 14 days   lisinopril 20 mg oral tablet 05/12/2020 take 1 tablet by oral route 2 times a day for 90 days   Protonix 40 mg oral tablet,delayed release (DR/EC) 10/01/2020 take 1 tablet (40 mg) by oral route once daily for 90 days   simvastatin 20 mg oral tablet 05/12/2020 take 1 tablet (20 mg) by oral route once daily in the evening for  "90 days   Synthroid 100 mcg oral tablet 10/01/2020 take 1 tablet (100 mcg) by oral route once daily for 90 days   Voltaren 1 % topical gel 10/29/2020 apply 4 grams to the affected area(s) by topical route 4 times per day         Allergy List  Allergen Name Date Reaction Notes   NO KNOWN DRUG ALLERGIES --  --  --    NO KNOWN DRUG ALLERGIES --  --  --          Family Medical History  Disease Name Relative/Age Notes   Heart Disease Father/   Father   Cancer, Unspecified Mother/   Mother   Family history of breast cancer Mother/40s   Mother/40s  Mother         Social History  Finding Status Start/Stop Quantity Notes   Alcohol Never --/-- --  does not drink   Alcohol Use Never --/-- --  does not drink   Homemaker. --  --/-- --  --    lives alone --  --/-- --  --    Recreational Drug Use Never --/-- --  no   Retired --  --/-- --  --    Single --  --/-- --  --    Tobacco Former --/-- --  former smoker    --  --/-- --  --    Working --  --/-- --  --          Immunizations  NameDate Admin Mfg Trade Name Lot Number Route Inj VIS Given VIS Publication   Fdgrxdvtx23/01/2020 SKB Fluarix, quadrivalent, preservative free [id number] NE NE 11/13/2020    Comments:    Eaqsjqcoj2657/01/2016 NE Not Entered  NE NE 11/26/2019    Comments:    Prevnar 1310/01/2017 NE Not Entered  NE NE 11/26/2019    Comments:          Review of Systems  · Constitutional  o Denies  o : fever, fatigue, weight loss, weight gain  · Cardiovascular  o Denies  o : lower extremity edema, claudication, chest pressure, palpitations  · Respiratory  o Denies  o : shortness of breath, wheezing, cough, hemoptysis, dyspnea on exertion  · Gastrointestinal  o Denies  o : nausea, vomiting, diarrhea, constipation, abdominal pain      Vitals  Date Time BP Position Site L\R Cuff Size HR RR TEMP (F) WT  HT  BMI kg/m2 BSA m2 O2 Sat FR L/min FiO2        11/13/2020 01:53 /56 Sitting    98 - R 16 98.2 203lbs 16oz 5'  4\" 35.02 2.04 96 %  21%          Physical " Examination  · Constitutional  o Appearance  o : well-nourished, well developed, alert, in no acute distress  · Eyes  o Conjunctivae  o : conjunctivae normal  o Sclerae  o : sclerae white  o Pupils and Irises  o : pupils equal, round, and reactive to light and accommodation bilaterally  o Corneas  o : tear film normal, no lesions present  o Eyelids/Ocular Adnexae  o : eyelid appearance normal, no exudates present, eye moisture level normal  · Respiratory  o Respiratory Effort  o : breathing unlabored  o Inspection of Chest  o : normal appearance, no retractions  o Auscultation of Lungs  o : normal breath sounds throughout  · Cardiovascular  o Heart  o :   § Auscultation of Heart  § : regular rate and rhythm without murmur, PMI normal  o Peripheral Vascular System  o :   § Extremities  § : no cyanosis, clubbing or edema; less than 2 second refill noted  · Musculoskeletal  o General  o : No joint swelling or deformity noted. Muscle tone, strength and development grossly normal.  · Skin and Subcutaneous Tissue  o General Inspection  o : no rashes or lesions present, no areas of discoloration  · Neurologic  o Mental Status Examination  o : judgement, insight intact, modd and affect appropriate  o Motor Examination  o : strength grossly intact in all four extremities  o Gait and Station  o : normal gait, able to stand without difficulty          Assessment  · Diabetes mellitus, type 2     250.00/E11.9  · Essential hypertension     401.9/I10  · GERD (gastroesophageal reflux disease)     530.81/K21.9  · Hypothyroidism     244.9/E03.9  · Elevated WBC count     288.60/D72.829      Plan  · Orders  o HTN/Lipid Panel (CMP, Lipid) [facility] (14151, 30011) - 401.9/I10 - 06/13/2021  o CBC with Auto Diff [facility] (19211) - 401.9/I10 - 06/13/2021  o Urinalysis with Reflex Microscopy ([facility]) (41350) - 401.9/I10 - 06/13/2021  o Thyroid Profile (95981, 51437, THYII) - 244.9/E03.9 - 06/13/2021  o CBC with Auto Diff [facility] (36380) - 288.60/D72.829 -  12/13/2020  o ACO-39: Current medications updated and reviewed (1159F, ) - - 11/13/2020  o ACO-14: Influenza immunization administered or previously received [facility] () - - 11/13/2020  o ACO-13: Fall Risk Screening with no falls in past year or only one fall without injury in the past year (1101F) - - 11/13/2020  · Medications  o lisinopril 20 mg oral tablet   SIG: take 1 tablet by oral route 2 times a day for 90 days   DISP: (180) Tablet with 1 refills  Refilled on 11/13/2020     o simvastatin 20 mg oral tablet   SIG: take 1 tablet (20 mg) by oral route once daily in the evening for 90 days   DISP: (90) Tablet with 1 refills  Refilled on 11/13/2020     o cefdinir 300 mg oral capsule   SIG: take 1 capsule by oral route 2 times a day for 14 days   DISP: (28) capsules with 0 refills  Discontinued on 11/13/2020     o Medications have been Reconciled  o Transition of Care or Provider Policy  · Instructions  o Patient was educated/instructed on their diagnosis, treatment and medications prior to discharge from the clinic today.  o Minutes spent with patient including greater than 50% in Education/Counseling/Care Coordination.  o Time spent with the patient was minutes, more than 50% face to face.  · Disposition  o Return in 6 months            Electronically Signed by: GEOVANNA Bajwa -Author on November 13, 2020 02:49:08 PM

## 2021-05-14 VITALS
BODY MASS INDEX: 34.83 KG/M2 | HEART RATE: 98 BPM | OXYGEN SATURATION: 96 % | DIASTOLIC BLOOD PRESSURE: 56 MMHG | HEIGHT: 64 IN | TEMPERATURE: 98.2 F | SYSTOLIC BLOOD PRESSURE: 134 MMHG | RESPIRATION RATE: 16 BRPM | WEIGHT: 204 LBS

## 2021-05-14 VITALS — OXYGEN SATURATION: 96 % | HEART RATE: 82 BPM | WEIGHT: 202 LBS | HEIGHT: 64 IN | BODY MASS INDEX: 34.49 KG/M2

## 2021-05-14 VITALS
OXYGEN SATURATION: 94 % | WEIGHT: 198.31 LBS | BODY MASS INDEX: 33.86 KG/M2 | DIASTOLIC BLOOD PRESSURE: 72 MMHG | TEMPERATURE: 98.9 F | HEART RATE: 54 BPM | HEIGHT: 64 IN | SYSTOLIC BLOOD PRESSURE: 126 MMHG

## 2021-05-14 VITALS
DIASTOLIC BLOOD PRESSURE: 75 MMHG | WEIGHT: 203.12 LBS | SYSTOLIC BLOOD PRESSURE: 144 MMHG | HEIGHT: 64 IN | HEART RATE: 77 BPM | BODY MASS INDEX: 34.68 KG/M2

## 2021-05-14 NOTE — PROGRESS NOTES
Progress Note      Patient Name: Bhakti Travis   Patient ID: [id number]   Sex: Female   YOB: 1945    Primary Care Provider: Zaid AUSTIN   Referring Provider: Zaid AUSTIN    Visit Date: April 30, 2021    Provider: GEOVANNA Bajwa   [place] [facility] [place]   Location Address: [address] [place] [address], [address]  [id number]   Location Phone: [phone]          Chief Complaint     Having nausea for 3-4 months.  hypertension, and hypoerlipidemia       History Of Present Illness  Bhakti Travis is a 76 year old /White female who presents for evaluation and treatment of:      Nausea 3-4 months. DEnies abdominal pain.Disida scan reviewed for other provider and was normal.  Newest med is HCTZ.  Nausea started about the same time.  has had a disida scan and was still normal function.  Pepto bismol helps sometimes.  Has been taking protonix without any difference.  Was originally given for pancreatitis.    Hypertension:  doing well on medication 126/72.    hyperlipidemia: doing well and stable.    Hypothyroid:  doing well has order to get labs.       Past Medical History  Disease Name Date Onset Notes   Essential hypertension --  --    High blood pressure --  --    High cholesterol --  --    Hyperlipemia --  --    Hypertension --  --    Hypothyroidism --  --    Hypothyroidism 11/13/2020 --    Kidney Stones --  --    Pancreatitis --  --          Past Surgical History  Procedure Name Date Notes   Appendectomy --  --    Back --  --    Colonoscopy --  --          Medication List  Name Date Started Instructions   amlodipine 10 mg oral tablet 04/30/2021 take 1 tablet (10 mg) by oral route once daily for 90 days   hydrochlorothiazide 25 mg oral tablet 04/30/2021 take 1 tablet (25 mg) by oral route once daily for 90 days   lisinopril 20 mg oral tablet 04/30/2021 take 1 tablet by oral route 2 times a day for 90 days   simvastatin 20 mg oral tablet  04/30/2021 take 1 tablet (20 mg) by oral route once daily in the evening for 90 days   Synthroid 100 mcg oral tablet 04/30/2021 take 1 tablet (100 mcg) by oral route once daily for 90 days   Voltaren 1 % topical gel 10/29/2020 apply 4 grams to the affected area(s) by topical route 4 times per day         Allergy List  Allergen Name Date Reaction Notes   NO KNOWN DRUG ALLERGIES --  --  --          Family Medical History  Disease Name Relative/Age Notes   Heart Disease Father/   Father   Cancer, Unspecified Mother/40   breast   Family history of breast cancer Mother/40s   Mother/40s  Mother   Family history of stomach cancer Grandmother (maternal)/50   --          Social History  Finding Status Start/Stop Quantity Notes   Alcohol Never --/-- --  does not drink   Alcohol Use Never --/-- --  does not drink   Homemaker. --  --/-- --  --    lives alone --  --/-- --  --    Recreational Drug Use Never --/-- --  no   Retired --  --/-- --  --    Single --  --/-- --  --    Tobacco Former --/-- --  former smoker    --  --/-- --  --    Working --  --/-- --  --          Immunizations  NameDate Admin Mfg Trade Name Lot Number Route Inj VIS Given VIS Publication   COVID Qevgkdo0702/03/2021 NE Not Entered  NE NE 02/04/2021    Comments:    Zhveeleqx60/01/2020 SKB Fluarix, quadrivalent, preservative free 2A2KX NE NE 11/13/2020    Comments:    Sajahthwp6342/01/2016 NE Not Entered  NE NE 11/26/2019    Comments:    Prevnar 1310/01/2017 NE Not Entered  NE NE 11/26/2019    Comments:          Review of Systems  · Constitutional  o Denies  o : fever, fatigue, weight loss, weight gain  · Cardiovascular  o Denies  o : lower extremity edema, claudication, chest pressure, palpitations  · Respiratory  o Denies  o : shortness of breath, wheezing, cough, hemoptysis, dyspnea on exertion  · Gastrointestinal  o Admits  o : nausea  o Denies  o : vomiting, diarrhea, constipation, abdominal pain      Vitals  Date Time BP Position Site L\R Cuff Size  "HR RR TEMP (F) WT  HT  BMI kg/m2 BSA m2 O2 Sat FR L/min FiO2 HC       02/23/2021 02:03 /75 Sitting    77 - R   203lbs 2oz 5'  4\" 34.87 2.04       04/30/2021 08:19 /72 Sitting    54 - R  98.9 198lbs 5oz 5'  4\" 34.04 2.02 94 %  21%          Physical Examination  · Constitutional  o Appearance  o : well-nourished, well developed, alert, in no acute distress  · Eyes  o Conjunctivae  o : conjunctivae normal  o Sclerae  o : sclerae white  o Pupils and Irises  o : pupils equal, round, and reactive to light and accommodation bilaterally  o Corneas  o : tear film normal, no lesions present  o Eyelids/Ocular Adnexae  o : eyelid appearance normal, no exudates present, eye moisture level normal  · Respiratory  o Respiratory Effort  o : breathing unlabored  o Inspection of Chest  o : normal appearance, no retractions  o Auscultation of Lungs  o : normal breath sounds throughout  · Cardiovascular  o Heart  o :   § Auscultation of Heart  § : regular rate and rhythm without murmur, PMI normal  o Peripheral Vascular System  o :   § Carotid Arteries  § : normal pulses bilaterally, no bruits present  § Pedal Pulses  § : pulses 2 bilaterally  § Extremities  § : no cyanosis, clubbing or edema; less than 2 second refill noted  · Gastrointestinal  o Abdominal Examination  o : abdomen nontender to palpation, normal bowel sounds, tone normal without rigidity or guarding  · Musculoskeletal  o General  o : No joint swelling or deformity noted. Muscle tone, strength and development grossly normal.  · Neurologic  o Mental Status Examination  o : judgement, insight intact, modd and affect appropriate  o Motor Examination  o : strength grossly intact in all four extremities  o Gait and Station  o : normal gait, able to stand without difficulty          Assessment  · Essential hypertension     401.9/I10  · Hyperlipidemia     272.4/E78.5  · Hypothyroidism     244.9/E03.9  · Screening for " depression     V79.0/Z13.89  · Nausea     787.02/R11.0  will stop protonix to see if helps. Will get tests for pancreas and hpylori. will send Carafate if has issues. But may take pepcid if needs while stopping protonix.      Plan  · Orders  o HTN/Lipid Panel (CMP, Lipid) [facility] (90336, 71263) - 401.9/I10 - 10/30/2021  o CBC with Auto Diff [facility] (78680) - 401.9/I10 - 10/30/2021  o Urinalysis with Reflex Microscopy ([facility]) (38536) - 401.9/I10 - 10/30/2021  o Thyroid Profile (14173, 16410, THYII) - 244.9/E03.9 - 10/30/2021  o Annual depression screening, 15 minutes (28460, ) - V79.0/Z13.89 - 04/30/2021  o ACO-18: Negative screen for clinical depression using a standardized tool () - V79.0/Z13.89 - 04/30/2021  o ACO-39: Current medications updated and reviewed (1159F, ) - - 04/30/2021  o H pylori ag stool [place] (22522) - 787.02/R11.0 - 04/30/2021  o Amylase/Lipase Profile (ALPN) - 787.02/R11.0 - 04/30/2021  · Medications  o amlodipine 10 mg oral tablet   SIG: take 1 tablet (10 mg) by oral route once daily for 90 days   DISP: (90) Tablet with 1 refills  Refilled on 04/30/2021     o hydrochlorothiazide 25 mg oral tablet   SIG: take 1 tablet (25 mg) by oral route once daily for 90 days   DISP: (90) Tablet with 1 refills  Refilled on 04/30/2021     o lisinopril 20 mg oral tablet   SIG: take 1 tablet by oral route 2 times a day for 90 days   DISP: (180) Tablet with 1 refills  Refilled on 04/30/2021     o simvastatin 20 mg oral tablet   SIG: take 1 tablet (20 mg) by oral route once daily in the evening for 90 days   DISP: (90) Tablet with 1 refills  Refilled on 04/30/2021     o Synthroid 100 mcg oral tablet   SIG: take 1 tablet (100 mcg) by oral route once daily for 90 days   DISP: (90) Tablet with 1 refills  Refilled on 04/30/2021     o Protonix 40 mg oral tablet,delayed release (DR/EC)   SIG: take 1 tablet (40 mg) by oral route once daily for 90 days   DISP: (90) Tablet with 1 refills  Discontinued on 04/30/2021      o Medications have been Reconciled  o Transition of Care or Provider Policy  · Instructions  o Advised that cheeses and other sources of dairy fats, animal fats, fast food, and the extras (candy, pastries, pies, doughnuts and cookies) all contain LDL raising nutrients. Advised to increase fruits, vegetables, whole grains, and to monitor portion sizes.   o Depression Screen completed and scanned into the EMR under the designated folder within the patient's documents.  o Today's PHQ-9 result is _2__  o The provider screening met the required time of 15 minutes.  o Patient was educated/instructed on their diagnosis, treatment and medications prior to discharge from the clinic today.  o Minutes spent with patient including greater than 50% in Education/Counseling/Care Coordination.  o Time spent with the patient was minutes, more than 50% face to face.  · Disposition  o Return in 6 months            Electronically Signed by: GEOVANNA Bajwa -Author on May 3, 2021 12:14:18 PM

## 2021-05-15 VITALS
WEIGHT: 198 LBS | DIASTOLIC BLOOD PRESSURE: 65 MMHG | HEIGHT: 64 IN | HEART RATE: 69 BPM | SYSTOLIC BLOOD PRESSURE: 122 MMHG | BODY MASS INDEX: 33.8 KG/M2 | TEMPERATURE: 97.6 F | WEIGHT: 199.25 LBS | BODY MASS INDEX: 34.02 KG/M2 | OXYGEN SATURATION: 96 % | HEIGHT: 64 IN | DIASTOLIC BLOOD PRESSURE: 78 MMHG | RESPIRATION RATE: 16 BRPM | SYSTOLIC BLOOD PRESSURE: 156 MMHG | HEART RATE: 64 BPM

## 2021-05-15 VITALS
BODY MASS INDEX: 34.31 KG/M2 | RESPIRATION RATE: 16 BRPM | HEIGHT: 64 IN | HEART RATE: 71 BPM | OXYGEN SATURATION: 92 % | SYSTOLIC BLOOD PRESSURE: 140 MMHG | WEIGHT: 201 LBS | TEMPERATURE: 98.3 F | DIASTOLIC BLOOD PRESSURE: 70 MMHG

## 2021-05-15 VITALS — HEIGHT: 64 IN | RESPIRATION RATE: 16 BRPM | BODY MASS INDEX: 33.63 KG/M2 | WEIGHT: 197 LBS

## 2021-06-02 ENCOUNTER — CONVERSION ENCOUNTER (OUTPATIENT)
Dept: FAMILY MEDICINE CLINIC | Facility: CLINIC | Age: 76
End: 2021-06-02

## 2021-06-02 ENCOUNTER — TRANSCRIBE ORDERS (OUTPATIENT)
Dept: ADMINISTRATIVE | Facility: HOSPITAL | Age: 76
End: 2021-06-02

## 2021-06-02 ENCOUNTER — OFFICE VISIT CONVERTED (OUTPATIENT)
Dept: FAMILY MEDICINE CLINIC | Facility: CLINIC | Age: 76
End: 2021-06-02
Attending: NURSE PRACTITIONER

## 2021-06-02 DIAGNOSIS — Z78.0 POST-MENOPAUSAL: ICD-10-CM

## 2021-06-02 DIAGNOSIS — Z12.31 SCREENING MAMMOGRAM, ENCOUNTER FOR: Primary | ICD-10-CM

## 2021-06-06 NOTE — PROGRESS NOTES
Progress Note      Patient Name: Bhakti Travis   Patient ID: [id number]   Sex: Female   YOB: 1945    Primary Care Provider: Zaid AUSTIN   Referring Provider: Sammi Andrade MD    Visit Date: June 2, 2021    Provider: GEOVANNA Bajwa   Location: [facility]   Location Address: [address] [place] [address], [address] [place] [address]  [id number]   Location Phone: [phone]          Chief Complaint     The patient is here for uncontrolled htn       History Of Present Illness  Bhakti Travis is a 76 year old /White female who presents for evaluation and treatment of:      Hypertension:  not well controlled on losartan and amlodipine. has held HCTZ due to kidney function. Has tried beta blockers in the past but couldn't tolerate due to pulse dropped to low states in the 40-50's.  Took clonidine but still staying consistently above 150/90's.  Gets headache when too high.    Takes blood pressures at home and notes that her right is usually 30 points higher systolic than her left.       Past Medical History  Disease Name Date Onset Notes   Essential hypertension --  --    High blood pressure --  --    High cholesterol --  --    Hyperlipemia --  --    Hypertension --  --    Hypothyroidism --  --    Hypothyroidism 11/13/2020 --    Kidney Stones --  --    Pancreatitis --  --          Past Surgical History  Procedure Name Date Notes   Appendectomy --  --    Back --  --    Colonoscopy --  --          Medication List  Name Date Started Instructions   amlodipine 10 mg oral tablet 04/30/2021 take 1 tablet (10 mg) by oral route once daily for 90 days   clonidine HCl 0.1 mg oral tablet 05/19/2021 take 1 tablet by oral route daily as needed   hydrochlorothiazide 25 mg oral tablet 04/30/2021 take 1 tablet (25 mg) by oral route once daily for 90 days   losartan 50 mg oral tablet 05/19/2021 take 1 tablet (50 mg) by oral route once daily for 90 days   simvastatin 20 mg oral tablet  04/30/2021 take 1 tablet (20 mg) by oral route once daily in the evening for 90 days   Synthroid 100 mcg oral tablet 04/30/2021 take 1 tablet (100 mcg) by oral route once daily for 90 days   Voltaren 1 % topical gel 10/29/2020 apply 4 grams to the affected area(s) by topical route 4 times per day         Allergy List  Allergen Name Date Reaction Notes   NO KNOWN DRUG ALLERGIES --  --  --        Allergies Reconciled  Family Medical History  Disease Name Relative/Age Notes   Heart Disease Father/   Father   Cancer, Unspecified Mother/40   breast   Family history of breast cancer Mother/40s   Mother/40s  Mother   Family history of stomach cancer Grandmother (maternal)/50   --          Social History  Finding Status Start/Stop Quantity Notes   Alcohol Never --/-- --  does not drink   Alcohol Use Never --/-- --  does not drink   Homemaker. --  --/-- --  --    lives alone --  --/-- --  --    Recreational Drug Use Never --/-- --  no   Retired --  --/-- --  --    Single --  --/-- --  --    Tobacco Former --/-- --  former smoker    --  --/-- --  --    Working --  --/-- --  --          Immunizations  NameDate Admin Mfg Trade Name Lot Number Route Inj VIS Given VIS Publication   COVID Ojadsqy9102/03/2021 NE Not Entered  NE NE 02/04/2021    Comments:    Dogzhgldb07/01/2020 SKB Fluarix, quadrivalent, preservative free [id number] NE NE 11/13/2020    Comments:    Ingpvzubp7190/01/2016 NE Not Entered  NE NE 11/26/2019    Comments:    Prevnar 1310/01/2017 NE Not Entered  NE NE 11/26/2019    Comments:          Review of Systems  · Constitutional  o Denies  o : fever, fatigue, weight loss, weight gain  · Cardiovascular  o Denies  o : lower extremity edema, claudication, chest pressure, palpitations  · Respiratory  o Denies  o : shortness of breath, wheezing, cough, hemoptysis, dyspnea on exertion  · Gastrointestinal  o Denies  o : nausea, vomiting, diarrhea, constipation, abdominal pain      Vitals  Date Time BP Position Site L\R Cuff  "Size HR RR TEMP (F) WT  HT  BMI kg/m2 BSA m2 O2 Sat FR L/min FiO2 HC       06/02/2021 07:13 /72 Sitting    77 - R 16 98 201lbs 0oz 5'  4\" 34.5 2.03 95 %  21%    06/02/2021 07:58 /80 Sitting Brachial Left XL              06/02/2021 07:58 /82 Sitting Brachial Right XL                    Physical Examination  · Constitutional  o Appearance  o : well-nourished, well developed, alert, in no acute distress  · Eyes  o Conjunctivae  o : conjunctivae normal  o Sclerae  o : sclerae white  o Pupils and Irises  o : pupils equal, round, and reactive to light and accommodation bilaterally  o Corneas  o : tear film normal, no lesions present  o Eyelids/Ocular Adnexae  o : eyelid appearance normal, no exudates present, eye moisture level normal  · Ears, Nose, Mouth and Throat  o Ears  o : external ear auricle normal, otic canal normal, TM with no reddness, effusion, retraction  o Nose  o : external normal, nasal mucosa normal, turbinates normal  o Oral Cavity  o : tongue no lesion, oral mucosa normal  o Throat  o : no erythemia, exudate or lesions  · Neck  o Inspection/Palpation  o : normal appearance, no masses or tenderness, trachea midline, no enlarged cervical or supraclavicular lymphnodes palpated  o Thyroid  o : gland size normal, nontender, no nodules or masses present on palpation, thyroid motion normal during swallowing  · Respiratory  o Respiratory Effort  o : breathing unlabored  o Inspection of Chest  o : normal appearance, no retractions  o Auscultation of Lungs  o : normal breath sounds throughout  · Cardiovascular  o Heart  o :   § Auscultation of Heart  § : regular rate and rhythm without murmur, PMI normal  o Peripheral Vascular System  o :   § Carotid Arteries  § : normal pulses bilaterally, no bruits present  § Pedal Pulses  § : pulses 2 bilaterally  § Extremities  § : no cyanosis, clubbing or edema; less than 2 second refill noted  · Musculoskeletal  o General  o : No joint swelling or " deformity noted. Muscle tone, strength and development grossly normal.  · Skin and Subcutaneous Tissue  o General Inspection  o : no rashes or lesions present, no areas of discoloration  · Neurologic  o Mental Status Examination  o : judgement, insight intact, modd and affect appropriate  o Motor Examination  o : strength grossly intact in all four extremities  o Gait and Station  o : normal gait, able to stand without difficulty          Assessment  · Post menopausal syndrome     V49.81/Z78.0  · Visit for screening mammogram     V76.12/Z12.31  · Essential hypertension     401.9/I10  abnormal reading of over 20 points from right to left arm. with uncontrolled hypertension. Refer to Cardiology for further. also will start Bystolic and see if blood pressures are better controlled. Will take bystolic and losartan in am and recheck blood pressure in evening and take amlodipine and losartan if needed. Will let us know what her blood pressures are.      Plan  · Orders  o DEXA Bone Density, 1 or more sites, axial skeleton [facility] ([id number]) - V49.81/Z78.0 - 06/02/2021  o Screening Mammography; Bilateral 3D (92888, 28491, ) - V76.12/Z12.31 - 06/02/2021  o ACO - Pt declines to or was not able to provide an Advance Care Plan or name a Surrogate Decision Maker (1124F) - - 06/02/2021  o ACO-14: Influenza immunization administered or previously received [facility] () - - 06/02/2021  o ACO-39: Current medications updated and reviewed (, 1159F) - - 06/02/2021  o CARDIOLOGY CONSULTATION (CARDI) - 401.9/I10 - 06/02/2021   uncontrolled and difference of over 20 points in arms systolic  · Medications  o Bystolic 5 mg oral tablet   SIG: take 1 tablet (5 mg) by oral route once daily for 14 days   DISP: (14) Tablet with 0 refills  Prescribed on 06/02/2021     o Medications have been Reconciled  o Transition of Care or Provider Policy  · Instructions  o Stop taking calcium supplements for at least 48 hours prior to your exam. Failure to  stop supplements could alter results, and the radiologists will require you to reschedule your test.  o Patient was educated/instructed on their diagnosis, treatment and medications prior to discharge from the clinic today.  o Minutes spent with patient including greater than 50% in Education/Counseling/Care Coordination.  o Time spent with the patient was minutes, more than 50% face to face.            Electronically Signed by: GEOVANNA Bajwa -Author on June 2, 2021 08:30:59 AM

## 2021-06-08 ENCOUNTER — TELEPHONE (OUTPATIENT)
Dept: FAMILY MEDICINE CLINIC | Facility: CLINIC | Age: 76
End: 2021-06-08

## 2021-06-08 DIAGNOSIS — N28.9 RENAL INSUFFICIENCY: ICD-10-CM

## 2021-06-08 DIAGNOSIS — I10 HYPERTENSION, UNCONTROLLED: Primary | ICD-10-CM

## 2021-06-08 NOTE — TELEPHONE ENCOUNTER
Patient called stating that she is still having issues with her blood pressure. She would like a phone call to discuss. She did not want to go to the ER.

## 2021-06-09 DIAGNOSIS — I10 UNCONTROLLED HYPERTENSION: Primary | ICD-10-CM

## 2021-06-09 RX ORDER — SIMVASTATIN 20 MG
TABLET ORAL
COMMUNITY
Start: 2021-03-30 | End: 2021-07-15

## 2021-06-09 RX ORDER — NEBIVOLOL 10 MG/1
10 TABLET ORAL DAILY
Qty: 90 TABLET | Refills: 1 | Status: SHIPPED | OUTPATIENT
Start: 2021-06-09 | End: 2021-06-10

## 2021-06-09 RX ORDER — CLONIDINE HYDROCHLORIDE 0.1 MG/1
TABLET ORAL
COMMUNITY
Start: 2021-05-19 | End: 2021-07-16 | Stop reason: SDUPTHER

## 2021-06-09 RX ORDER — LOSARTAN POTASSIUM 50 MG/1
TABLET ORAL
COMMUNITY
Start: 2021-05-19 | End: 2021-07-06

## 2021-06-09 NOTE — TELEPHONE ENCOUNTER
What has her blood pressures and heart rate been?  If still elevated and tolerating bystolic can take 2 and will send in script for 10mg.

## 2021-06-09 NOTE — TELEPHONE ENCOUNTER
PATIENT WAS CALLING BACK REQUESTING TO SPEAK WITH PROVIDER.     PATIENT BLOOD PRESSURE   190/100

## 2021-06-10 ENCOUNTER — CLINICAL SUPPORT (OUTPATIENT)
Dept: FAMILY MEDICINE CLINIC | Facility: CLINIC | Age: 76
End: 2021-06-10

## 2021-06-10 VITALS — DIASTOLIC BLOOD PRESSURE: 61 MMHG | SYSTOLIC BLOOD PRESSURE: 160 MMHG

## 2021-06-10 DIAGNOSIS — I10 HYPERTENSION, UNCONTROLLED: ICD-10-CM

## 2021-06-10 DIAGNOSIS — I10 ESSENTIAL HYPERTENSION: Primary | ICD-10-CM

## 2021-06-10 PROBLEM — K85.90 PANCREATITIS: Status: ACTIVE | Noted: 2021-06-10

## 2021-06-10 PROBLEM — N20.0 KIDNEY STONES: Status: ACTIVE | Noted: 2021-06-10

## 2021-06-10 PROBLEM — E03.9 HYPOTHYROIDISM: Status: ACTIVE | Noted: 2020-11-13

## 2021-06-10 PROBLEM — E78.5 HYPERLIPEMIA: Status: ACTIVE | Noted: 2021-06-10

## 2021-06-10 RX ORDER — NEBIVOLOL 10 MG/1
10 TABLET ORAL DAILY
Qty: 90 TABLET | Refills: 1 | COMMUNITY
Start: 2021-06-10 | End: 2021-06-14

## 2021-06-10 NOTE — PROGRESS NOTES
Bystolic 5 mg to continue once a day.  Stop amlodipine.  Losartan in am and take clonidine that has helped the most of all to keep blood pressures regulated.

## 2021-06-10 NOTE — PROGRESS NOTES
Patient came in today for a BP check to compare her BP cuff with ours. Her cuff read 210/90 while ours read 160/61.  Pt states that she tried the second dose of bystolic last night but caused her jitters. She states she was okay with one dose but second dose she could not tolerate. Pt states today she took losartan and clonidine this morning but not bystolic.   Pt is wanting you to call her at the end of the day.

## 2021-06-14 ENCOUNTER — HOSPITAL ENCOUNTER (EMERGENCY)
Facility: HOSPITAL | Age: 76
Discharge: LEFT BEFORE DISCHARGE | End: 2021-06-14

## 2021-06-14 ENCOUNTER — TELEPHONE (OUTPATIENT)
Dept: FAMILY MEDICINE CLINIC | Facility: CLINIC | Age: 76
End: 2021-06-14

## 2021-06-14 VITALS
OXYGEN SATURATION: 99 % | DIASTOLIC BLOOD PRESSURE: 83 MMHG | TEMPERATURE: 98.2 F | HEIGHT: 64 IN | BODY MASS INDEX: 34.55 KG/M2 | HEART RATE: 80 BPM | WEIGHT: 202.38 LBS | SYSTOLIC BLOOD PRESSURE: 221 MMHG | RESPIRATION RATE: 16 BRPM

## 2021-06-14 DIAGNOSIS — I10 ESSENTIAL HYPERTENSION: Primary | ICD-10-CM

## 2021-06-14 PROCEDURE — 99211 OFF/OP EST MAY X REQ PHY/QHP: CPT

## 2021-06-14 RX ORDER — METOPROLOL SUCCINATE 25 MG/1
25 TABLET, EXTENDED RELEASE ORAL DAILY
Qty: 30 TABLET | Refills: 1 | Status: SHIPPED | OUTPATIENT
Start: 2021-06-14 | End: 2021-07-14 | Stop reason: SDUPTHER

## 2021-06-14 NOTE — TELEPHONE ENCOUNTER
Bystolic is $300.00 so she would like something different,  her blood pressures are still high at night.

## 2021-06-14 NOTE — TELEPHONE ENCOUNTER
Caller: Bhakti Travis    Relationship: Self    Best call back number:[phone]    What medications are you currently taking:   Current Outpatient Medications on File Prior to Visit   Medication Sig Dispense Refill   • cloNIDine (CATAPRES) 0.1 MG tablet clonidine HCl 0.1 mg oral tablet take 1 tablet by oral route daily as needed 5/19/2021  Active     • levothyroxine (SYNTHROID) 100 mcg.     • losartan (COZAAR) 50 MG tablet      • nebivolol (Bystolic) 10 MG tablet Take 1 tablet by mouth Daily. Indications: bystolic 20mg;[id number] exp 2/22 #14 tabs to be cut in half and taken daily 90 tablet 1   • simvastatin (ZOCOR) 20 MG tablet        No current facility-administered medications on file prior to visit.        When did you start taking these medications: ABOUT 2 WEEKS AGOO    Which medication are you concerned about: BYSTOLIC AND CLONIDINE AND ALSO LOSARTAN. ANA HAS BEEN ADJUSTING AND CHANGING BLOOD PRESSURE MEDICINES. PATIENT NEEDS TO DISCUSS THESE WITH HER. THEY ARE NOT WORKING. ALSO THE BYSTOLIC IS VERY EXPENSIVE. PLEASE CALL BACK TO PATIENT.    Who prescribed you this medication: ANA HENRIQUEZ      What are your concerns: COST AND NOT WORKING IN PM    How long have you been taking these medications: 2 WEEKS

## 2021-06-15 DIAGNOSIS — I10 UNCONTROLLED HYPERTENSION: Primary | ICD-10-CM

## 2021-06-17 ENCOUNTER — TRANSCRIBE ORDERS (OUTPATIENT)
Dept: FAMILY MEDICINE CLINIC | Facility: CLINIC | Age: 76
End: 2021-06-17

## 2021-06-17 ENCOUNTER — TELEPHONE (OUTPATIENT)
Dept: FAMILY MEDICINE CLINIC | Facility: CLINIC | Age: 76
End: 2021-06-17

## 2021-06-17 DIAGNOSIS — I10 ACCELERATED ESSENTIAL HYPERTENSION: Primary | ICD-10-CM

## 2021-06-17 NOTE — TELEPHONE ENCOUNTER
Called patient to confirm she is taking losartan and the Bystolic in the morning.  Was taking clonidine at night.  However she is noted that her blood pressure is getting extremely high at night in the 200s.  And waking up in the morning with a systolic at 240.  She has run for when she was traveling back in February that her blood pressure got high and in the ER they gave her clonidine to bring it down.  She does have some orders in.  We will confirm that she is now going to an outside source to cardiology since July 28 is too far out for her to get seen for this.  So she is going to start taking the metoprolol that she just picked up tonight.  And according what her morning is may take the losartan metoprolol in the morning as well.  She is in follow-up Monday.

## 2021-06-17 NOTE — TELEPHONE ENCOUNTER
Blood pressure during the day holding in the 150's , now is taking 180-190s at night still, 230's in the morning.had a weird feeling under her left breast,  She doesn't know what to do at night time.  Walking every morning, doesn't eat salt.  She has a cardiology appointment on  7/26/21.  She finished the Bystolic. She hasn't started the metoprolol, will pick it up today. She went to the ER a couple of days ago, but didn't stay. She has an appointment Monday and will discuss it with you then.

## 2021-06-21 ENCOUNTER — OFFICE VISIT (OUTPATIENT)
Dept: FAMILY MEDICINE CLINIC | Facility: CLINIC | Age: 76
End: 2021-06-21

## 2021-06-21 VITALS
OXYGEN SATURATION: 94 % | HEIGHT: 64 IN | HEART RATE: 61 BPM | TEMPERATURE: 96.9 F | RESPIRATION RATE: 16 BRPM | SYSTOLIC BLOOD PRESSURE: 150 MMHG | BODY MASS INDEX: 33.97 KG/M2 | DIASTOLIC BLOOD PRESSURE: 86 MMHG | WEIGHT: 199 LBS

## 2021-06-21 DIAGNOSIS — R73.01 IMPAIRED FASTING GLUCOSE: ICD-10-CM

## 2021-06-21 DIAGNOSIS — I10 UNCONTROLLED HYPERTENSION: Primary | ICD-10-CM

## 2021-06-21 PROCEDURE — 1170F FXNL STATUS ASSESSED: CPT | Performed by: NURSE PRACTITIONER

## 2021-06-21 PROCEDURE — 1159F MED LIST DOCD IN RCRD: CPT | Performed by: NURSE PRACTITIONER

## 2021-06-21 PROCEDURE — 1126F AMNT PAIN NOTED NONE PRSNT: CPT | Performed by: NURSE PRACTITIONER

## 2021-06-21 PROCEDURE — 99212 OFFICE O/P EST SF 10 MIN: CPT | Performed by: NURSE PRACTITIONER

## 2021-06-21 PROCEDURE — G0439 PPPS, SUBSEQ VISIT: HCPCS | Performed by: NURSE PRACTITIONER

## 2021-06-21 NOTE — PROGRESS NOTES
\  Chief Complaint  No chief complaint on file.    Subjective        Bhakti Travis presents to [facility]  Uncontrolled hypertension:  Due to elevated blood pressure was able to get patient into Cardiology sooner than her end of July visit.  However, stated the June 24,2021 visit didn't work for her so has been changed to the first week in July per her request.        Past History:    Medical History: has a past medical history of Essential hypertension, High blood pressure, High cholesterol, Hyperlipemia, Hypertension, Hypothyroidism (11/13/2020), Kidney stones, and Pancreatitis.     Surgical History: has a past surgical history that includes Appendectomy; Back surgery; and Colonoscopy.     Family History: family history includes Breast cancer in her mother; Heart disease in her father; Stomach cancer (age of onset: 50) in her maternal grandmother.     Social History: reports that she has never smoked. She does not have any smokeless tobacco history on file. She reports that she does not drink alcohol and does not use drugs.    Allergies: Patient has no known allergies.          Current Outpatient Medications:   •  cloNIDine (CATAPRES) 0.1 MG tablet, clonidine HCl 0.1 mg oral tablet take 1 tablet by oral route daily as needed 5/19/2021  Active, Disp: , Rfl:   •  levothyroxine (SYNTHROID), 100 mcg., Disp: , Rfl:   •  losartan (COZAAR) 50 MG tablet, , Disp: , Rfl:   •  metoprolol succinate XL (Toprol XL) 25 MG 24 hr tablet, Take 1 tablet by mouth Daily., Disp: 30 tablet, Rfl: 1  •  simvastatin (ZOCOR) 20 MG tablet, , Disp: , Rfl:     There are no discontinued medications.      Review of Systems   Constitutional: Negative for fever.   Respiratory: Negative for cough and shortness of breath.    Cardiovascular: Negative for chest pain, palpitations and leg swelling.   Neurological: Negative for dizziness, weakness, numbness and headache.        Objective         There were no vitals  filed for this visit.  There is no height or weight on file to calculate BMI.         Physical Exam  Vitals reviewed.   Constitutional:       Appearance: Normal appearance. She is well-developed.   HENT:      Head: Normocephalic and atraumatic.      Mouth/Throat:      Pharynx: No oropharyngeal exudate.   Eyes:      Conjunctiva/sclera: Conjunctivae normal.      Pupils: Pupils are equal, round, and reactive to light.   Cardiovascular:      Rate and Rhythm: Normal rate and regular rhythm.      Heart sounds: No murmur heard.   No friction rub. No gallop.    Pulmonary:      Effort: Pulmonary effort is normal.      Breath sounds: Normal breath sounds. No wheezing or rhonchi.   Skin:     General: Skin is warm and dry.   Neurological:      Mental Status: She is alert and oriented to person, place, and time.   Psychiatric:         Mood and Affect: Mood and affect normal.         Behavior: Behavior normal.         Thought Content: Thought content normal.         Judgment: Judgment normal.             Result Review :  Kidney function dropped to 50 from normal range with creatinine elevated 1.08.  A1C is 6.2% mildly elevated.               Assessment and Plan     Diagnoses and all orders for this visit:    1. Uncontrolled hypertension (Primary)    2. Impaired fasting glucose              Follow Up     No follow-ups on file.    Patient was given instructions and counseling regarding her condition or for health maintenance advice. Please see specific information pulled into the AVS if appropriate.

## 2021-07-02 ENCOUNTER — LAB (OUTPATIENT)
Dept: LAB | Facility: HOSPITAL | Age: 76
End: 2021-07-02

## 2021-07-02 DIAGNOSIS — I10 UNCONTROLLED HYPERTENSION: ICD-10-CM

## 2021-07-02 LAB
ALBUMIN SERPL-MCNC: 4.4 G/DL (ref 3.5–5.2)
ALBUMIN/GLOB SERPL: 1.7 G/DL
ALP SERPL-CCNC: 62 U/L (ref 39–117)
ALT SERPL W P-5'-P-CCNC: 18 U/L (ref 1–33)
ANION GAP SERPL CALCULATED.3IONS-SCNC: 11 MMOL/L (ref 5–15)
AST SERPL-CCNC: 16 U/L (ref 1–32)
BILIRUB SERPL-MCNC: 0.3 MG/DL (ref 0–1.2)
BUN SERPL-MCNC: 15 MG/DL (ref 8–23)
BUN/CREAT SERPL: 15 (ref 7–25)
CALCIUM SPEC-SCNC: 9.9 MG/DL (ref 8.6–10.5)
CHLORIDE SERPL-SCNC: 103 MMOL/L (ref 98–107)
CO2 SERPL-SCNC: 27 MMOL/L (ref 22–29)
CREAT SERPL-MCNC: 1 MG/DL (ref 0.57–1)
GFR SERPL CREATININE-BSD FRML MDRD: 54 ML/MIN/1.73
GLOBULIN UR ELPH-MCNC: 2.6 GM/DL
GLUCOSE SERPL-MCNC: 97 MG/DL (ref 65–99)
POTASSIUM SERPL-SCNC: 4.7 MMOL/L (ref 3.5–5.2)
PROT SERPL-MCNC: 7 G/DL (ref 6–8.5)
SODIUM SERPL-SCNC: 141 MMOL/L (ref 136–145)

## 2021-07-02 PROCEDURE — 36415 COLL VENOUS BLD VENIPUNCTURE: CPT

## 2021-07-02 PROCEDURE — 84244 ASSAY OF RENIN: CPT

## 2021-07-02 PROCEDURE — 82088 ASSAY OF ALDOSTERONE: CPT

## 2021-07-02 PROCEDURE — 83835 ASSAY OF METANEPHRINES: CPT

## 2021-07-02 PROCEDURE — 80053 COMPREHEN METABOLIC PANEL: CPT

## 2021-07-06 ENCOUNTER — HOSPITAL ENCOUNTER (OUTPATIENT)
Dept: ULTRASOUND IMAGING | Facility: HOSPITAL | Age: 76
Discharge: HOME | End: 2021-07-06
Admitting: NURSE PRACTITIONER

## 2021-07-06 ENCOUNTER — TELEPHONE (OUTPATIENT)
Dept: FAMILY MEDICINE CLINIC | Facility: CLINIC | Age: 76
End: 2021-07-06

## 2021-07-06 DIAGNOSIS — I10 UNCONTROLLED HYPERTENSION: ICD-10-CM

## 2021-07-06 DIAGNOSIS — I10 UNCONTROLLED HYPERTENSION: Primary | ICD-10-CM

## 2021-07-06 PROCEDURE — 76775 US EXAM ABDO BACK WALL LIM: CPT

## 2021-07-06 RX ORDER — LOSARTAN POTASSIUM 100 MG/1
100 TABLET ORAL DAILY
Qty: 90 TABLET | Refills: 1 | Status: SHIPPED | OUTPATIENT
Start: 2021-07-06 | End: 2021-07-07

## 2021-07-06 NOTE — TELEPHONE ENCOUNTER
PT IS OUT OF LOSARTAN 50 MG, PT HAS BEEN TAKING 2 TS PO QD AND IS OUT DUE TO NEW DOSAGE INSTRUCTIONS PER PROVIDER. PLEASE ADVICE.

## 2021-07-07 DIAGNOSIS — I10 UNCONTROLLED HYPERTENSION: ICD-10-CM

## 2021-07-07 RX ORDER — LOSARTAN POTASSIUM 50 MG/1
50 TABLET ORAL 2 TIMES DAILY
Qty: 60 TABLET | Refills: 3 | Status: SHIPPED | OUTPATIENT
Start: 2021-07-07 | End: 2021-07-15

## 2021-07-08 ENCOUNTER — TELEPHONE (OUTPATIENT)
Dept: FAMILY MEDICINE CLINIC | Facility: CLINIC | Age: 76
End: 2021-07-08

## 2021-07-08 DIAGNOSIS — I10 UNCONTROLLED HYPERTENSION: ICD-10-CM

## 2021-07-08 LAB
METANEPH FREE SERPL-MCNC: 41.1 PG/ML (ref 0–88)
NORMETANEPHRINE SERPL-MCNC: 128.1 PG/ML (ref 0–191.8)

## 2021-07-08 NOTE — TELEPHONE ENCOUNTER
PT CALLED PHARMACY, HER B/P MEDICATION 50 MG TWICE A DAY IS NOT THERE. HER MEDICATION WAS SENT IN BUT WAS SENT  MG TABLETS THAT WERE NOT SCORED.

## 2021-07-09 LAB
ALDOST SERPL-MCNC: <1 NG/DL (ref 0–30)
ALDOST SERPL-MCNC: <1 NG/DL (ref 0–30)
ALDOST/RENIN PLAS-RTO: <1.5 {RATIO} (ref 0–30)
RENIN PLAS-CCNC: 0.67 NG/ML/HR (ref 0.17–5.38)

## 2021-07-11 LAB — RENIN PLAS-CCNC: 0.71 NG/ML/HR (ref 0.17–5.38)

## 2021-07-14 ENCOUNTER — OFFICE VISIT (OUTPATIENT)
Dept: CARDIOLOGY | Facility: CLINIC | Age: 76
End: 2021-07-14

## 2021-07-14 VITALS
BODY MASS INDEX: 33.8 KG/M2 | WEIGHT: 198 LBS | HEART RATE: 58 BPM | DIASTOLIC BLOOD PRESSURE: 66 MMHG | HEIGHT: 64 IN | SYSTOLIC BLOOD PRESSURE: 168 MMHG

## 2021-07-14 DIAGNOSIS — I49.3 PVC (PREMATURE VENTRICULAR CONTRACTION): ICD-10-CM

## 2021-07-14 DIAGNOSIS — E78.5 HYPERLIPIDEMIA, UNSPECIFIED HYPERLIPIDEMIA TYPE: ICD-10-CM

## 2021-07-14 DIAGNOSIS — I10 ESSENTIAL HYPERTENSION: Primary | ICD-10-CM

## 2021-07-14 DIAGNOSIS — R06.02 SOB (SHORTNESS OF BREATH): ICD-10-CM

## 2021-07-14 PROCEDURE — 93000 ELECTROCARDIOGRAM COMPLETE: CPT | Performed by: INTERNAL MEDICINE

## 2021-07-14 PROCEDURE — 99204 OFFICE O/P NEW MOD 45 MIN: CPT | Performed by: INTERNAL MEDICINE

## 2021-07-14 RX ORDER — ASPIRIN 81 MG/1
81 TABLET, CHEWABLE ORAL DAILY
COMMUNITY

## 2021-07-14 RX ORDER — METOPROLOL SUCCINATE 25 MG/1
25 TABLET, EXTENDED RELEASE ORAL DAILY
Qty: 90 TABLET | Refills: 3 | Status: SHIPPED | OUTPATIENT
Start: 2021-07-14 | End: 2021-09-17

## 2021-07-14 NOTE — ASSESSMENT & PLAN NOTE
Not ideally controlled discussed with patient at length different strategies for managing.  Not want to use diuretics due to previous intolerances and was not willing to change Zocor to try amlodipine.  Did  her on exercise low-sodium diet and weight loss.  Gave her a home blood pressure log she wanted to wait until after her work-up with echocardiogram and Holter before deciding on other blood pressure treatment medications

## 2021-07-14 NOTE — ASSESSMENT & PLAN NOTE
Bigeminal in nature with an associated junctional escape beat after some of the beats.  Commend 24-hour Holter monitor continue with current dose of Toprol.  We will also get echocardiogram

## 2021-07-14 NOTE — PROGRESS NOTES
Chief Complaint  Hypertension      History of Present Illness  Bhakti Travis presents to [facility]     Patient is a 76-year-old female who has a history of hypertension but recently been 1 going more difficult to control she has tried several medications in the past before but either not been effective or caused side effects patient has been experiencing some problems with headaches and heart palpitations noticing more at night she denies any chest pain or shortness of breath at rest.  She has had some sensations when she lays down at night abdomen short of breath and like her breath catches the has not had any syncope or presyncopal spell    Past Medical History:   Diagnosis Date   • Arrhythmia    • Essential hypertension    • Heart murmur    • High blood pressure    • High cholesterol    • Hyperlipemia    • Hypertension    • Hypothyroidism 11/13/2020   • Kidney stones    • Pancreatitis    • PVC (premature ventricular contraction) 7/14/2021   • Sleep apnea          Current Outpatient Medications:   •  aspirin 81 MG chewable tablet, Chew 81 mg Daily., Disp: , Rfl:   •  levothyroxine (SYNTHROID), 100 mcg., Disp: , Rfl:   •  losartan (Cozaar) 50 MG tablet, Take 1 tablet by mouth 2 (Two) Times a Day., Disp: 60 tablet, Rfl: 3  •  metoprolol succinate XL (Toprol XL) 25 MG 24 hr tablet, Take 1 tablet by mouth Daily., Disp: 90 tablet, Rfl: 3  •  simvastatin (ZOCOR) 20 MG tablet, , Disp: , Rfl:   •  cloNIDine (CATAPRES) 0.1 MG tablet, clonidine HCl 0.1 mg oral tablet take 1 tablet by oral route daily as needed 5/19/2021  Active, Disp: , Rfl:     Medications Discontinued During This Encounter   Medication Reason   • metoprolol succinate XL (Toprol XL) 25 MG 24 hr tablet Reorder     No Known Allergies     Social History     Tobacco Use   • Smoking status: Former Smoker     Packs/day: 1.00     Years: 25.00     Pack years: 25.00     Start date: 3/1/1966     Quit date: 12/30/1995     Years since  "quittin.5   • Smokeless tobacco: Never Used   • Tobacco comment: quit 25 years ago   Vaping Use   • Vaping Use: Never used   Substance Use Topics   • Alcohol use: Never   • Drug use: Never       Family History   Problem Relation Age of Onset   • Breast cancer Mother         40's Family History of    • Hypertension Mother    • Hypertension Father    • COPD Father    • Stomach cancer Maternal Grandmother 50        Family history of   • Hypertension Sister    • Hypertension Brother         Objective     /66   Pulse 58   Ht 162.6 cm (64\")   Wt 89.8 kg (198 lb)   BMI 33.99 kg/m²       Physical Exam  Constitutional:       General: He is awake. He is not in acute distress.     Appearance: Normal appearance.   Neck:      Vascular: No carotid bruit, hepatojugular reflux or JVD.   Cardiovascular:      Rate and Rhythm: Normal rate and regular rhythm.      Chest Wall: PMI is not displaced.      Heart sounds: Normal heart sounds, S1 normal and S2 normal. No murmur heard.   No friction rub. No gallop. No S3 or S4 sounds.    Pulmonary:      Effort: Pulmonary effort is normal.      Breath sounds: Normal breath sounds. No wheezing, rhonchi or rales.   Ext.      Right lower leg: No edema.      Left lower leg: No edema.   Skin:     General: Skin is warm and dry.      Coloration: Skin is not cyanotic.      Findings: No petechiae or rash.   Neurological:      Mental Status: He is alert.   Psychiatric:         Behavior: Behavior is cooperative.       Result Review :     No results found for: PROBNP  CMP    CMP 21   Glucose   97   Glucose 122 (A) 104 (A)    BUN 20 19 15   Creatinine 1.26 (A) 1.08 (A) 1.00   eGFR Non African Am   54 (A)   Sodium 141 144 141   Potassium 5.5 (A) 4.7 4.7   Chloride 103 107 103   Calcium 11.0 (A) 10.3 9.9   Albumin 4.5 4.4 4.40   Total Bilirubin 0.33 0.20 0.3   Alkaline Phosphatase 65 71 62   AST (SGOT) 20 16 16   ALT (SGPT) 23 21 18   (A) Abnormal value            CBC w/diff "    CBC w/Diff 12/23/20 4/30/21 5/12/21   WBC 9.85 11.25 (A) 10.16   RBC 4.89 4.49 4.54   Hemoglobin 13.7 12.6 13.2   Hematocrit 44.2 40.7 40.4   MCV 90.4 90.6 89.0   MCH 28.0 28.1 29.1   MCHC 31.0 (A) 31.0 (A) 32.7 (A)   RDW 14.2 14.4 14.5 (A)   Platelets 206 213 211   Neutrophil Rel % 60.8 65.2 57.6   Lymphocyte Rel % 31.4 25.4 34.8   Monocyte Rel % 5.5 6.8 5.0   Eosinophil Rel % 1.7 1.9 2.1   Basophil Rel % 0.3 0.3 0.2   (A) Abnormal value             Lab Results   Component Value Date    TSH 0.627 04/30/2021      Lab Results   Component Value Date    FREET4 1.6 04/30/2021      No results found for: DDIMERQUANT  Magnesium   Date Value Ref Range Status   10/31/2019 1.76 1.60 - 2.30 mg/dL Final      No results found for: DIGOXIN   Lab Results   Component Value Date    TROPONINT <0.01 10/30/2019           Lipid Panel    Lipid Panel 10/30/20 4/30/21   Total Cholesterol 197 172   Triglycerides 66 95   HDL Cholesterol 72 (A) 59   VLDL Cholesterol 13 19   LDL Cholesterol  112 (A) 94   (A) Abnormal value       Comments are available for some flowsheets but are not being displayed.                ECG 12 Lead    Date/Time: 7/14/2021 5:08 PM  Performed by: Aram Rutherford MD  Authorized by: Aram Rutherford MD   Previous ECG: no previous ECG available  Rhythm: sinus rhythm  Ectopy: unifocal PVCs  Conduction: non-specific intraventricular conduction delay              No results found for this or any previous visit.               Diagnoses and all orders for this visit:    1. Essential hypertension (Primary)  Assessment & Plan:  Not ideally controlled discussed with patient at length different strategies for managing.  Not want to use diuretics due to previous intolerances and was not willing to change Zocor to try amlodipine.  Did  her on exercise low-sodium diet and weight loss.  Gave her a home blood pressure log she wanted to wait until after her work-up with echocardiogram and Holter before deciding on other blood  pressure treatment medications    Orders:  -     metoprolol succinate XL (Toprol XL) 25 MG 24 hr tablet; Take 1 tablet by mouth Daily.  Dispense: 90 tablet; Refill: 3    2. Hyperlipidemia, unspecified hyperlipidemia type    3. PVC (premature ventricular contraction)  Assessment & Plan:  Bigeminal in nature with an associated junctional escape beat after some of the beats.  Commend 24-hour Holter monitor continue with current dose of Toprol.  We will also get echocardiogram      4. SOB (shortness of breath)  -     Adult Transthoracic Echo Complete W/ Cont if Necessary Per Protocol; Future  -     Holter Monitor - 24 Hour; Future  -     BNP; Future    Other orders  -     ECG 12 Lead          Follow Up     Return in about 3 months (around 10/14/2021).    Patient was given instructions and counseling regarding her condition or for health maintenance advice. Please see specific information pulled into the AVS if appropriate.

## 2021-07-15 ENCOUNTER — APPOINTMENT (OUTPATIENT)
Dept: GENERAL RADIOLOGY | Facility: HOSPITAL | Age: 76
End: 2021-07-15

## 2021-07-15 ENCOUNTER — TELEPHONE (OUTPATIENT)
Dept: CARDIOLOGY | Facility: CLINIC | Age: 76
End: 2021-07-15

## 2021-07-15 ENCOUNTER — HOSPITAL ENCOUNTER (EMERGENCY)
Facility: HOSPITAL | Age: 76
Discharge: HOME | End: 2021-07-15
Attending: EMERGENCY MEDICINE | Admitting: EMERGENCY MEDICINE

## 2021-07-15 VITALS
BODY MASS INDEX: 34.31 KG/M2 | SYSTOLIC BLOOD PRESSURE: 160 MMHG | HEIGHT: 64 IN | RESPIRATION RATE: 16 BRPM | HEART RATE: 77 BPM | DIASTOLIC BLOOD PRESSURE: 72 MMHG | TEMPERATURE: 98 F | WEIGHT: 201 LBS | OXYGEN SATURATION: 95 %

## 2021-07-15 VITALS
HEART RATE: 61 BPM | HEIGHT: 64 IN | RESPIRATION RATE: 18 BRPM | DIASTOLIC BLOOD PRESSURE: 58 MMHG | WEIGHT: 199.08 LBS | BODY MASS INDEX: 33.99 KG/M2 | TEMPERATURE: 98.2 F | OXYGEN SATURATION: 95 % | SYSTOLIC BLOOD PRESSURE: 184 MMHG

## 2021-07-15 DIAGNOSIS — I10 ESSENTIAL HYPERTENSION: ICD-10-CM

## 2021-07-15 DIAGNOSIS — R07.89 ATYPICAL CHEST PAIN: Primary | ICD-10-CM

## 2021-07-15 LAB
ALBUMIN SERPL-MCNC: 4.6 G/DL (ref 3.5–5.2)
ALBUMIN/GLOB SERPL: 1.3 G/DL
ALP SERPL-CCNC: 76 U/L (ref 39–117)
ALT SERPL W P-5'-P-CCNC: 18 U/L (ref 1–33)
ANION GAP SERPL CALCULATED.3IONS-SCNC: 12.6 MMOL/L (ref 5–15)
AST SERPL-CCNC: 17 U/L (ref 1–32)
BASOPHILS # BLD AUTO: 0.03 10*3/MM3 (ref 0–0.2)
BASOPHILS NFR BLD AUTO: 0.3 % (ref 0–1.5)
BILIRUB SERPL-MCNC: 0.2 MG/DL (ref 0–1.2)
BUN SERPL-MCNC: 22 MG/DL (ref 8–23)
BUN/CREAT SERPL: 19.6 (ref 7–25)
CALCIUM SPEC-SCNC: 10.3 MG/DL (ref 8.6–10.5)
CHLORIDE SERPL-SCNC: 103 MMOL/L (ref 98–107)
CK MB SERPL-CCNC: 3.56 NG/ML
CK SERPL-CCNC: 125 U/L (ref 20–180)
CO2 SERPL-SCNC: 25.4 MMOL/L (ref 22–29)
CREAT SERPL-MCNC: 1.12 MG/DL (ref 0.57–1)
DEPRECATED RDW RBC AUTO: 47.1 FL (ref 37–54)
EOSINOPHIL # BLD AUTO: 0.21 10*3/MM3 (ref 0–0.4)
EOSINOPHIL NFR BLD AUTO: 2.1 % (ref 0.3–6.2)
ERYTHROCYTE [DISTWIDTH] IN BLOOD BY AUTOMATED COUNT: 14.2 % (ref 12.3–15.4)
GFR SERPL CREATININE-BSD FRML MDRD: 47 ML/MIN/1.73
GLOBULIN UR ELPH-MCNC: 3.5 GM/DL
GLUCOSE SERPL-MCNC: 110 MG/DL (ref 65–99)
HCT VFR BLD AUTO: 42.3 % (ref 34–46.6)
HGB BLD-MCNC: 13.4 G/DL (ref 12–15.9)
HOLD SPECIMEN: NORMAL
HOLD SPECIMEN: NORMAL
IMM GRANULOCYTES # BLD AUTO: 0.03 10*3/MM3 (ref 0–0.05)
IMM GRANULOCYTES NFR BLD AUTO: 0.3 % (ref 0–0.5)
LIPASE SERPL-CCNC: 26 U/L (ref 13–60)
LYMPHOCYTES # BLD AUTO: 3.37 10*3/MM3 (ref 0.7–3.1)
LYMPHOCYTES NFR BLD AUTO: 33.1 % (ref 19.6–45.3)
MAGNESIUM SERPL-MCNC: 2.1 MG/DL (ref 1.6–2.4)
MCH RBC QN AUTO: 28.9 PG (ref 26.6–33)
MCHC RBC AUTO-ENTMCNC: 31.7 G/DL (ref 31.5–35.7)
MCV RBC AUTO: 91.2 FL (ref 79–97)
MONOCYTES # BLD AUTO: 0.66 10*3/MM3 (ref 0.1–0.9)
MONOCYTES NFR BLD AUTO: 6.5 % (ref 5–12)
NEUTROPHILS NFR BLD AUTO: 5.89 10*3/MM3 (ref 1.7–7)
NEUTROPHILS NFR BLD AUTO: 57.7 % (ref 42.7–76)
NRBC BLD AUTO-RTO: 0 /100 WBC (ref 0–0.2)
NT-PROBNP SERPL-MCNC: 298 PG/ML (ref 0–1800)
PLATELET # BLD AUTO: 191 10*3/MM3 (ref 140–450)
PMV BLD AUTO: 12 FL (ref 6–12)
POTASSIUM SERPL-SCNC: 4.7 MMOL/L (ref 3.5–5.2)
PROT SERPL-MCNC: 8.1 G/DL (ref 6–8.5)
RBC # BLD AUTO: 4.64 10*6/MM3 (ref 3.77–5.28)
SODIUM SERPL-SCNC: 141 MMOL/L (ref 136–145)
TROPONIN I SERPL-MCNC: 0.01 NG/ML (ref 0–0.6)
TROPONIN I SERPL-MCNC: 0.01 NG/ML (ref 0–0.6)
WBC # BLD AUTO: 10.19 10*3/MM3 (ref 3.4–10.8)
WHOLE BLOOD HOLD SPECIMEN: NORMAL

## 2021-07-15 PROCEDURE — 93005 ELECTROCARDIOGRAM TRACING: CPT | Performed by: EMERGENCY MEDICINE

## 2021-07-15 PROCEDURE — 99283 EMERGENCY DEPT VISIT LOW MDM: CPT

## 2021-07-15 PROCEDURE — 82550 ASSAY OF CK (CPK): CPT

## 2021-07-15 PROCEDURE — 83690 ASSAY OF LIPASE: CPT

## 2021-07-15 PROCEDURE — 83880 ASSAY OF NATRIURETIC PEPTIDE: CPT

## 2021-07-15 PROCEDURE — 82553 CREATINE MB FRACTION: CPT

## 2021-07-15 PROCEDURE — 84484 ASSAY OF TROPONIN QUANT: CPT

## 2021-07-15 PROCEDURE — 80053 COMPREHEN METABOLIC PANEL: CPT

## 2021-07-15 PROCEDURE — 85025 COMPLETE CBC W/AUTO DIFF WBC: CPT

## 2021-07-15 PROCEDURE — 71045 X-RAY EXAM CHEST 1 VIEW: CPT

## 2021-07-15 PROCEDURE — 93005 ELECTROCARDIOGRAM TRACING: CPT

## 2021-07-15 PROCEDURE — 99284 EMERGENCY DEPT VISIT MOD MDM: CPT

## 2021-07-15 PROCEDURE — 83735 ASSAY OF MAGNESIUM: CPT

## 2021-07-15 PROCEDURE — 93010 ELECTROCARDIOGRAM REPORT: CPT | Performed by: INTERNAL MEDICINE

## 2021-07-15 RX ORDER — ATORVASTATIN CALCIUM 10 MG/1
10 TABLET, FILM COATED ORAL DAILY
Qty: 90 TABLET | Refills: 3 | Status: SHIPPED | OUTPATIENT
Start: 2021-07-15 | End: 2021-09-17

## 2021-07-15 RX ORDER — SODIUM CHLORIDE 0.9 % (FLUSH) 0.9 %
10 SYRINGE (ML) INJECTION AS NEEDED
Status: DISCONTINUED | OUTPATIENT
Start: 2021-07-15 | End: 2021-07-15 | Stop reason: HOSPADM

## 2021-07-15 RX ORDER — AMLODIPINE AND VALSARTAN 5; 320 MG/1; MG/1
1 TABLET ORAL DAILY
Qty: 30 TABLET | Refills: 11 | Status: SHIPPED | OUTPATIENT
Start: 2021-07-15 | End: 2021-08-06

## 2021-07-15 RX ORDER — ASPIRIN 81 MG/1
324 TABLET, CHEWABLE ORAL ONCE
Status: COMPLETED | OUTPATIENT
Start: 2021-07-15 | End: 2021-07-15

## 2021-07-15 RX ORDER — LOSARTAN POTASSIUM 50 MG/1
50 TABLET ORAL 2 TIMES DAILY
COMMUNITY
End: 2021-08-06

## 2021-07-15 RX ADMIN — ASPIRIN 324 MG: 81 TABLET, CHEWABLE ORAL at 20:10

## 2021-07-15 NOTE — TELEPHONE ENCOUNTER
We will have patient discontinue her Cozaar and change to Exforge 320/5 mg once a day and since there is a potential interaction with Zocor we will have her change to Lipitor milligrams daily.

## 2021-07-15 NOTE — TELEPHONE ENCOUNTER
Received VM from patient.    She stated she now wants to make the medication changes that were discussed during yesterdays visit.    She requested these to be sent to her pharmacy.  Neville Walker.

## 2021-07-16 ENCOUNTER — OFFICE VISIT (OUTPATIENT)
Dept: FAMILY MEDICINE CLINIC | Facility: CLINIC | Age: 76
End: 2021-07-16

## 2021-07-16 VITALS
HEIGHT: 64 IN | RESPIRATION RATE: 18 BRPM | SYSTOLIC BLOOD PRESSURE: 170 MMHG | BODY MASS INDEX: 33.29 KG/M2 | TEMPERATURE: 97.3 F | OXYGEN SATURATION: 95 % | HEART RATE: 61 BPM | WEIGHT: 195 LBS | DIASTOLIC BLOOD PRESSURE: 92 MMHG

## 2021-07-16 DIAGNOSIS — I10 ESSENTIAL HYPERTENSION: Primary | ICD-10-CM

## 2021-07-16 LAB
HOLD SPECIMEN: NORMAL
HOLD SPECIMEN: NORMAL
QT INTERVAL: 419 MS

## 2021-07-16 PROCEDURE — 99214 OFFICE O/P EST MOD 30 MIN: CPT | Performed by: NURSE PRACTITIONER

## 2021-07-16 RX ORDER — CLONIDINE HYDROCHLORIDE 0.1 MG/1
0.1 TABLET ORAL DAILY PRN
Qty: 30 TABLET | Refills: 0 | Status: SHIPPED | OUTPATIENT
Start: 2021-07-16

## 2021-07-16 NOTE — PROGRESS NOTES
Chief Complaint  Hypertension (discuss cardiology and ER visit)    Subjective        Bhakti Travis presents to [facility]  Went to ED after taking Metoprolol yesterday.  Tests were normal.  Has had that feeling in the past of chest pain after taking and feeling like was going to die.      Blood pressures are 170/92 today.  Hasn't started or picked up medication today.  States isn't going to take metoprolol still after last night.  Does have clonidine for breakthrough uncontrolled blood pressures.  Still working out of town regularly so needs backup if blood pressure spikes.            Past History:    Medical History: has a past medical history of Arrhythmia, Essential hypertension, Heart murmur, High blood pressure, High cholesterol, Hyperlipemia, Hypertension, Hypothyroidism (11/13/2020), Kidney stones, Pancreatitis, PVC (premature ventricular contraction) (7/14/2021), and Sleep apnea.     Surgical History: has a past surgical history that includes Appendectomy; Back surgery; and Colonoscopy.     Family History: family history includes Breast cancer in her mother; COPD in her father; Hypertension in her brother, father, mother, and sister; Stomach cancer (age of onset: 50) in her maternal grandmother.     Social History: reports that she quit smoking about 25 years ago. She started smoking about 55 years ago. She has a 25.00 pack-year smoking history. She has never used smokeless tobacco. She reports that she does not drink alcohol and does not use drugs.    Allergies: Patient has no known allergies.          Current Outpatient Medications:   •  amLODIPine-valsartan (Exforge) 5-320 MG per tablet, Take 1 tablet by mouth Daily., Disp: 30 tablet, Rfl: 11  •  aspirin 81 MG chewable tablet, Chew 81 mg Daily., Disp: , Rfl:   •  atorvastatin (LIPITOR) 10 MG tablet, Take 1 tablet by mouth Daily., Disp: 90 tablet, Rfl: 3  •  cloNIDine (CATAPRES) 0.1 MG tablet, Take 1 tablet by mouth  "Daily As Needed for High Blood Pressure (for retained blood pressure over 170/90)., Disp: 30 tablet, Rfl: 0  •  levothyroxine (SYNTHROID), 100 mcg., Disp: , Rfl:   •  losartan (COZAAR) 50 MG tablet, Take 50 mg by mouth 2 (Two) Times a Day., Disp: , Rfl:   •  metoprolol succinate XL (Toprol XL) 25 MG 24 hr tablet, Take 1 tablet by mouth Daily., Disp: 90 tablet, Rfl: 3  No current facility-administered medications for this visit.    Medications Discontinued During This Encounter   Medication Reason   • cloNIDine (CATAPRES) 0.1 MG tablet Reorder         Review of Systems   Constitutional: Negative for fever.   Respiratory: Negative for cough and shortness of breath.    Cardiovascular: Negative for chest pain, palpitations and leg swelling.   Neurological: Negative for dizziness, weakness, numbness and headache.        Objective         Vitals:    07/16/21 0909   BP: 170/92   BP Location: Left arm   Patient Position: Sitting   Cuff Size: Large Adult   Pulse: 61   Resp: 18   Temp: 97.3 °F (36.3 °C)   TempSrc: Infrared   SpO2: 95%   Weight: 88.5 kg (195 lb)   Height: 162.6 cm (64\")     Body mass index is 33.47 kg/m².         Physical Exam  Vitals reviewed.   Constitutional:       Appearance: Normal appearance. She is well-developed.   HENT:      Head: Normocephalic and atraumatic.      Mouth/Throat:      Pharynx: No oropharyngeal exudate.   Eyes:      Conjunctiva/sclera: Conjunctivae normal.      Pupils: Pupils are equal, round, and reactive to light.   Cardiovascular:      Rate and Rhythm: Normal rate and regular rhythm.      Heart sounds: No murmur heard.   No friction rub. No gallop.    Pulmonary:      Effort: Pulmonary effort is normal.      Breath sounds: Normal breath sounds. No wheezing or rhonchi.   Skin:     General: Skin is warm and dry.   Neurological:      Mental Status: She is alert and oriented to person, place, and time.   Psychiatric:         Mood and Affect: Mood and affect normal.         Behavior: " Behavior normal.         Thought Content: Thought content normal.         Judgment: Judgment normal.             Result Review :               Assessment and Plan     Diagnoses and all orders for this visit:    1. Essential hypertension (Primary)  Comments:  take clonidine only if blood pressure spikes after taking daily medication. for B/P 170/90  Orders:  -     cloNIDine (CATAPRES) 0.1 MG tablet; Take 1 tablet by mouth Daily As Needed for High Blood Pressure (for retained blood pressure over 170/90).  Dispense: 30 tablet; Refill: 0              Follow Up     No follow-ups on file.    Patient was given instructions and counseling regarding her condition or for health maintenance advice. Please see specific information pulled into the AVS if appropriate.

## 2021-07-16 NOTE — TELEPHONE ENCOUNTER
"Called and informed patient of medication changes.    She states that Metoprolol is causing side effects.  These include being jittery, nausea, and \"feeling like she is going to die.\"  She went to the ER last night at [facility] for these side effects.   Please advise.   "

## 2021-07-23 ENCOUNTER — TELEPHONE (OUTPATIENT)
Dept: CARDIOLOGY | Facility: CLINIC | Age: 76
End: 2021-07-23

## 2021-07-23 NOTE — TELEPHONE ENCOUNTER
Admission on 07/15/2021, Discharged on 07/15/2021   Component Date Value Ref Range Status   • QT Interval 07/15/2021 419  ms Final   • Glucose 07/15/2021 110* 65 - 99 mg/dL Final   • BUN 07/15/2021 22  8 - 23 mg/dL Final   • Creatinine 07/15/2021 1.12* 0.57 - 1.00 mg/dL Final   • Sodium 07/15/2021 141  136 - 145 mmol/L Final   • Potassium 07/15/2021 4.7  3.5 - 5.2 mmol/L Final    Slight hemolysis detected by analyzer. Results may be affected.   • Chloride 07/15/2021 103  98 - 107 mmol/L Final   • CO2 07/15/2021 25.4  22.0 - 29.0 mmol/L Final   • Calcium 07/15/2021 10.3  8.6 - 10.5 mg/dL Final   • Total Protein 07/15/2021 8.1  6.0 - 8.5 g/dL Final   • Albumin 07/15/2021 4.60  3.50 - 5.20 g/dL Final   • ALT (SGPT) 07/15/2021 18  1 - 33 U/L Final   • AST (SGOT) 07/15/2021 17  1 - 32 U/L Final   • Alkaline Phosphatase 07/15/2021 76  39 - 117 U/L Final   • Total Bilirubin 07/15/2021 0.2  0.0 - 1.2 mg/dL Final   • eGFR Non African Amer 07/15/2021 47* >60 mL/min/1.73 Final   • Globulin 07/15/2021 3.5  gm/dL Final   • A/G Ratio 07/15/2021 1.3  g/dL Final   • BUN/Creatinine Ratio 07/15/2021 19.6  7.0 - 25.0 Final   • Anion Gap 07/15/2021 12.6  5.0 - 15.0 mmol/L Final   • Lipase 07/15/2021 26  13 - 60 U/L Final   • proBNP 07/15/2021 298.0  0.0-1,800.0 pg/mL Final   • Magnesium 07/15/2021 2.1  1.6 - 2.4 mg/dL Final   • CKMB 07/15/2021 3.56  <=5.30 ng/mL Final   • Creatine Kinase 07/15/2021 125  20 - 180 U/L Final   • Extra Tube 07/15/2021 Hold for add-ons.   Final    Auto resulted.   • Extra Tube 07/15/2021 hold for add-on   Final    Auto resulted   • Extra Tube 07/15/2021 Hold for add-ons.   Final    Auto resulted.   • Extra Tube 07/15/2021 Hold for add-ons.   Final    Auto resulted.   • Extra Tube 07/15/2021 Hold for add-ons.   Final    Auto resulted.   • WBC 07/15/2021 10.19  3.40 - 10.80 10*3/mm3 Final   • RBC 07/15/2021 4.64  3.77 - 5.28 10*6/mm3 Final   • Hemoglobin 07/15/2021 13.4  12.0 - 15.9 g/dL Final   • Hematocrit  07/15/2021 42.3  34.0 - 46.6 % Final   • MCV 07/15/2021 91.2  79.0 - 97.0 fL Final   • MCH 07/15/2021 28.9  26.6 - 33.0 pg Final   • MCHC 07/15/2021 31.7  31.5 - 35.7 g/dL Final   • RDW 07/15/2021 14.2  12.3 - 15.4 % Final   • RDW-SD 07/15/2021 47.1  37.0 - 54.0 fl Final   • MPV 07/15/2021 12.0  6.0 - 12.0 fL Final   • Platelets 07/15/2021 191  140 - 450 10*3/mm3 Final   • Neutrophil % 07/15/2021 57.7  42.7 - 76.0 % Final   • Lymphocyte % 07/15/2021 33.1  19.6 - 45.3 % Final   • Monocyte % 07/15/2021 6.5  5.0 - 12.0 % Final   • Eosinophil % 07/15/2021 2.1  0.3 - 6.2 % Final   • Basophil % 07/15/2021 0.3  0.0 - 1.5 % Final   • Immature Grans % 07/15/2021 0.3  0.0 - 0.5 % Final   • Neutrophils, Absolute 07/15/2021 5.89  1.70 - 7.00 10*3/mm3 Final   • Lymphocytes, Absolute 07/15/2021 3.37* 0.70 - 3.10 10*3/mm3 Final   • Monocytes, Absolute 07/15/2021 0.66  0.10 - 0.90 10*3/mm3 Final   • Eosinophils, Absolute 07/15/2021 0.21  0.00 - 0.40 10*3/mm3 Final   • Basophils, Absolute 07/15/2021 0.03  0.00 - 0.20 10*3/mm3 Final   • Immature Grans, Absolute 07/15/2021 0.03  0.00 - 0.05 10*3/mm3 Final   • nRBC 07/15/2021 0.0  0.0 - 0.2 /100 WBC Final   • Troponin I 07/15/2021 0.01  0.00 - 0.60 ng/mL Final    Serial Number: [id number]:  [id number]   • Troponin I 07/15/2021 0.01  0.00 - 0.60 ng/mL Final    Serial Number: [id number]:  [id number]

## 2021-07-27 NOTE — TELEPHONE ENCOUNTER
Pt had BNP on 7/19  298.0      We had ordered one.  Not sure if you have seen this yet.  She had other labs as well.  Please see labs tab

## 2021-08-06 ENCOUNTER — TELEPHONE (OUTPATIENT)
Dept: CARDIOLOGY | Facility: CLINIC | Age: 76
End: 2021-08-06

## 2021-08-06 RX ORDER — LEVOTHYROXINE SODIUM 0.1 MG/1
100 TABLET ORAL DAILY
Qty: 90 TABLET | Refills: 1 | Status: SHIPPED | OUTPATIENT
Start: 2021-08-06 | End: 2021-08-06

## 2021-08-06 RX ORDER — AMLODIPINE AND VALSARTAN 10; 320 MG/1; MG/1
1 TABLET ORAL DAILY
Qty: 30 TABLET | Refills: 11 | Status: SHIPPED | OUTPATIENT
Start: 2021-08-06 | End: 2022-03-18 | Stop reason: SDUPTHER

## 2021-08-06 RX ORDER — LEVOTHYROXINE SODIUM 0.1 MG/1
100 TABLET ORAL DAILY
COMMUNITY
End: 2021-08-06 | Stop reason: SDUPTHER

## 2021-08-06 RX ORDER — LEVOTHYROXINE SODIUM 100 MCG
100 TABLET ORAL DAILY
Qty: 90 TABLET | Refills: 1 | Status: SHIPPED | OUTPATIENT
Start: 2021-08-06 | End: 2021-11-29 | Stop reason: SDUPTHER

## 2021-08-06 RX ORDER — LEVOTHYROXINE SODIUM 100 MCG
100 TABLET ORAL DAILY
COMMUNITY
End: 2021-08-06 | Stop reason: SDUPTHER

## 2021-08-09 ENCOUNTER — TELEPHONE (OUTPATIENT)
Dept: FAMILY MEDICINE CLINIC | Facility: CLINIC | Age: 76
End: 2021-08-09

## 2021-08-09 NOTE — TELEPHONE ENCOUNTER
ISABEL WITH [facility] CALLED AND STATED THAT IN ORDER FOR PT TO GET BRAND NAME SYNTHROID, THE ASSISTANT WILL HAVE TO CALL THE PHARMACY LINE AND CHANGE THE PRESCRIPTION FROM DAMARI 1 TO DAMARI 5. SHE STATES THAT WHEN YOU CALL, THEY WILL ASK FOR A REF 3, WHICH IS [id number]. THE NUMBER -[phone]. SHE ALSO REQUESTED THAT THERE BE 4 FILLS ON MED.

## 2021-08-12 NOTE — TELEPHONE ENCOUNTER
Concepcion Carlos APRN Alexander, Nicole  Notify pt holter result:   Baseline rhythm was normal sinus rhythm average heart rate 70 bpm   Minimum heart rate 45 bpm/Maximum heart rate was 124 bpm   PVCs 13 (rare)   PACs 41 (rare)   1 SVT 12 beats in duration rate of 120s consider ectopic atrial tachycardia   Continue current dose of metoprolol, keep October follow up     Result Text  · Estimated left ventricular EF = 55% Left ventricular systolic function is normal.  · Mild aortic valve stenosis is present. Aortic valve area is 1.7 cm2.    Patient all aware of results and medication increase. She viewed the result via [employer].

## 2021-09-17 ENCOUNTER — LAB (OUTPATIENT)
Dept: LAB | Facility: HOSPITAL | Age: 76
End: 2021-09-17

## 2021-09-17 ENCOUNTER — OFFICE VISIT (OUTPATIENT)
Dept: FAMILY MEDICINE CLINIC | Facility: CLINIC | Age: 76
End: 2021-09-17

## 2021-09-17 VITALS
OXYGEN SATURATION: 96 % | HEART RATE: 72 BPM | WEIGHT: 196 LBS | RESPIRATION RATE: 16 BRPM | SYSTOLIC BLOOD PRESSURE: 146 MMHG | HEIGHT: 64 IN | DIASTOLIC BLOOD PRESSURE: 82 MMHG | BODY MASS INDEX: 33.46 KG/M2 | TEMPERATURE: 97.8 F

## 2021-09-17 DIAGNOSIS — R06.02 SOB (SHORTNESS OF BREATH): ICD-10-CM

## 2021-09-17 DIAGNOSIS — E03.9 HYPOTHYROIDISM, UNSPECIFIED TYPE: ICD-10-CM

## 2021-09-17 DIAGNOSIS — Z01.419 ENCOUNTER FOR GYNECOLOGICAL EXAMINATION (GENERAL) (ROUTINE) WITHOUT ABNORMAL FINDINGS: ICD-10-CM

## 2021-09-17 DIAGNOSIS — Z12.4 SCREENING FOR CERVICAL CANCER: Primary | ICD-10-CM

## 2021-09-17 DIAGNOSIS — I10 ESSENTIAL HYPERTENSION: ICD-10-CM

## 2021-09-17 DIAGNOSIS — E78.5 HYPERLIPIDEMIA, UNSPECIFIED HYPERLIPIDEMIA TYPE: ICD-10-CM

## 2021-09-17 DIAGNOSIS — H26.9 CATARACT OF RIGHT EYE, UNSPECIFIED CATARACT TYPE: ICD-10-CM

## 2021-09-17 DIAGNOSIS — R73.01 IMPAIRED FASTING GLUCOSE: ICD-10-CM

## 2021-09-17 LAB
ALBUMIN SERPL-MCNC: 4.7 G/DL (ref 3.5–5.2)
ALBUMIN/GLOB SERPL: 2.1 G/DL
ALP SERPL-CCNC: 66 U/L (ref 39–117)
ALT SERPL W P-5'-P-CCNC: 19 U/L (ref 1–33)
ANION GAP SERPL CALCULATED.3IONS-SCNC: 10.6 MMOL/L (ref 5–15)
AST SERPL-CCNC: 15 U/L (ref 1–32)
BILIRUB SERPL-MCNC: 0.2 MG/DL (ref 0–1.2)
BILIRUB UR QL STRIP: NEGATIVE
BUN SERPL-MCNC: 20 MG/DL (ref 8–23)
BUN/CREAT SERPL: 21.3 (ref 7–25)
CALCIUM SPEC-SCNC: 10.4 MG/DL (ref 8.6–10.5)
CHLORIDE SERPL-SCNC: 103 MMOL/L (ref 98–107)
CHOLEST SERPL-MCNC: 164 MG/DL (ref 0–200)
CLARITY UR: CLEAR
CO2 SERPL-SCNC: 26.4 MMOL/L (ref 22–29)
COLOR UR: YELLOW
CREAT SERPL-MCNC: 0.94 MG/DL (ref 0.57–1)
DEPRECATED RDW RBC AUTO: 41.9 FL (ref 37–54)
ERYTHROCYTE [DISTWIDTH] IN BLOOD BY AUTOMATED COUNT: 13.1 % (ref 12.3–15.4)
GFR SERPL CREATININE-BSD FRML MDRD: 58 ML/MIN/1.73
GLOBULIN UR ELPH-MCNC: 2.2 GM/DL
GLUCOSE SERPL-MCNC: 108 MG/DL (ref 65–99)
GLUCOSE UR STRIP-MCNC: NEGATIVE MG/DL
HBA1C MFR BLD: 6.26 % (ref 4.8–5.6)
HCT VFR BLD AUTO: 41.4 % (ref 34–46.6)
HDLC SERPL QL: 2.65
HDLC SERPL-MCNC: 62 MG/DL (ref 40–60)
HGB BLD-MCNC: 13.4 G/DL (ref 12–15.9)
HGB UR QL STRIP.AUTO: NEGATIVE
KETONES UR QL STRIP: NEGATIVE
LDLC SERPL CALC-MCNC: 88 MG/DL (ref 0–100)
LEUKOCYTE ESTERASE UR QL STRIP.AUTO: NEGATIVE
MCH RBC QN AUTO: 28.4 PG (ref 26.6–33)
MCHC RBC AUTO-ENTMCNC: 32.4 G/DL (ref 31.5–35.7)
MCV RBC AUTO: 87.7 FL (ref 79–97)
NITRITE UR QL STRIP: NEGATIVE
NT-PROBNP SERPL-MCNC: 32.8 PG/ML (ref 0–1800)
PH UR STRIP.AUTO: 6 [PH] (ref 5–8)
PLATELET # BLD AUTO: 207 10*3/MM3 (ref 140–450)
PMV BLD AUTO: 12.3 FL (ref 6–12)
POTASSIUM SERPL-SCNC: 4.5 MMOL/L (ref 3.5–5.2)
PROT SERPL-MCNC: 6.9 G/DL (ref 6–8.5)
PROT UR QL STRIP: NEGATIVE
RBC # BLD AUTO: 4.72 10*6/MM3 (ref 3.77–5.28)
SODIUM SERPL-SCNC: 140 MMOL/L (ref 136–145)
SP GR UR STRIP: 1.02 (ref 1–1.03)
TRIGL SERPL-MCNC: 74 MG/DL (ref 0–150)
TSH SERPL DL<=0.05 MIU/L-ACNC: 1.75 UIU/ML (ref 0.27–4.2)
UROBILINOGEN UR QL STRIP: NORMAL
VLDLC SERPL-MCNC: 14 MG/DL (ref 5–40)
WBC # BLD AUTO: 9.64 10*3/MM3 (ref 3.4–10.8)

## 2021-09-17 PROCEDURE — 83880 ASSAY OF NATRIURETIC PEPTIDE: CPT

## 2021-09-17 PROCEDURE — 81003 URINALYSIS AUTO W/O SCOPE: CPT

## 2021-09-17 PROCEDURE — G0148 SCR C/V CYTO, AUTOSYS, RESCR: HCPCS | Performed by: NURSE PRACTITIONER

## 2021-09-17 PROCEDURE — 80053 COMPREHEN METABOLIC PANEL: CPT

## 2021-09-17 PROCEDURE — 99213 OFFICE O/P EST LOW 20 MIN: CPT | Performed by: NURSE PRACTITIONER

## 2021-09-17 PROCEDURE — 84443 ASSAY THYROID STIM HORMONE: CPT

## 2021-09-17 PROCEDURE — 85027 COMPLETE CBC AUTOMATED: CPT

## 2021-09-17 PROCEDURE — G0101 CA SCREEN;PELVIC/BREAST EXAM: HCPCS | Performed by: NURSE PRACTITIONER

## 2021-09-17 PROCEDURE — 83036 HEMOGLOBIN GLYCOSYLATED A1C: CPT

## 2021-09-17 PROCEDURE — 36415 COLL VENOUS BLD VENIPUNCTURE: CPT

## 2021-09-17 PROCEDURE — 80061 LIPID PANEL: CPT

## 2021-09-17 RX ORDER — SIMVASTATIN 20 MG
20 TABLET ORAL NIGHTLY
COMMUNITY
End: 2021-11-29 | Stop reason: SDUPTHER

## 2021-09-17 NOTE — PROGRESS NOTES
Chief Complaint  Gynecologic Exam    Subjective        Bhakti Travis presents to [facility]  Gynecology exam today.  Blood pressure better controlled and tolerates simvastatin better than atorvastatin so returned to taking and blood pressure remaining stable.    Would like flu shot.    Would like to ophthalmology for cataracts.            Past History:    Medical History: has a past medical history of Arrhythmia, Essential hypertension, Heart murmur, High blood pressure, High cholesterol, Hyperlipemia, Hypertension, Hypothyroidism (11/13/2020), Kidney stones, Pancreatitis, PVC (premature ventricular contraction) (7/14/2021), and Sleep apnea.     Surgical History: has a past surgical history that includes Appendectomy; Back surgery; Colonoscopy; and Spine surgery.     Family History: family history includes Breast cancer in her mother; COPD in her father; Hyperlipidemia in her mother; Hypertension in her brother, father, mother, and sister; Stomach cancer (age of onset: 50) in her maternal grandmother.     Social History: reports that she quit smoking about 25 years ago. She started smoking about 55 years ago. She has a 25.00 pack-year smoking history. She has never used smokeless tobacco. She reports that she does not drink alcohol and does not use drugs.    Allergies: Patient has no known allergies.          Current Outpatient Medications:   •  amLODIPine-valsartan (Exforge)  MG per tablet, Take 1 tablet by mouth Daily., Disp: 30 tablet, Rfl: 11  •  aspirin 81 MG chewable tablet, Chew 81 mg Daily., Disp: , Rfl:   •  cloNIDine (CATAPRES) 0.1 MG tablet, Take 1 tablet by mouth Daily As Needed for High Blood Pressure (for retained blood pressure over 170/90)., Disp: 30 tablet, Rfl: 0  •  simvastatin (ZOCOR) 20 MG tablet, Take 20 mg by mouth Every Night., Disp: , Rfl:   •  Synthroid 100 MCG tablet, Take 1 tablet by mouth Daily., Disp: 90 tablet, Rfl: 1    Medications  "Discontinued During This Encounter   Medication Reason   • atorvastatin (LIPITOR) 10 MG tablet *Therapy completed   • metoprolol succinate XL (Toprol XL) 25 MG 24 hr tablet *Therapy completed         Review of Systems   Constitutional: Negative for fever.   Respiratory: Negative for cough and shortness of breath.    Cardiovascular: Negative for chest pain, palpitations and leg swelling.   Neurological: Negative for dizziness, weakness, numbness and headache.        Objective         Vitals:    09/17/21 0655   BP: 146/82   BP Location: Right arm   Patient Position: Sitting   Cuff Size: Large Adult   Pulse: 72   Resp: 16   Temp: 97.8 °F (36.6 °C)   TempSrc: Infrared   SpO2: 96%   Weight: 88.9 kg (196 lb)   Height: 162.6 cm (64\")     Body mass index is 33.64 kg/m².         Physical Exam  Vitals reviewed.   Constitutional:       Appearance: Normal appearance. She is well-developed.   HENT:      Head: Normocephalic and atraumatic.      Right Ear: External ear normal.      Left Ear: External ear normal.      Mouth/Throat:      Pharynx: No oropharyngeal exudate.   Eyes:      Conjunctiva/sclera: Conjunctivae normal.      Pupils: Pupils are equal, round, and reactive to light.   Cardiovascular:      Rate and Rhythm: Normal rate and regular rhythm.      Heart sounds: No murmur heard.   No friction rub. No gallop.    Pulmonary:      Effort: Pulmonary effort is normal.      Breath sounds: Normal breath sounds. No wheezing or rhonchi.   Abdominal:      General: Bowel sounds are normal. There is no distension.      Palpations: Abdomen is soft.      Tenderness: There is no abdominal tenderness.   Genitourinary:     General: Normal vulva.      Exam position: Lithotomy position.      Misha stage (genital): 5.      Vagina: Normal.      Cervix: No cervical motion tenderness or friability.      Adnexa: Right adnexa normal and left adnexa normal.   Skin:     General: Skin is warm and dry.   Neurological:      Mental Status: She is " alert and oriented to person, place, and time.      Cranial Nerves: No cranial nerve deficit.   Psychiatric:         Mood and Affect: Mood and affect normal.         Behavior: Behavior normal.         Thought Content: Thought content normal.         Judgment: Judgment normal.             Result Review :               Assessment and Plan     Diagnoses and all orders for this visit:    1. Screening for cervical cancer (Primary)  -     IGP, Rfx Aptima HPV ASCU    2. Cataract of right eye, unspecified cataract type  -     Ambulatory Referral to Ophthalmology    3. Impaired fasting glucose  -     Hemoglobin A1c; Future  -     Hemoglobin A1c; Future    4. Hypothyroidism, unspecified type  Comments:  continue levothyroxine at current does  Orders:  -     TSH; Future  -     TSH; Future    5. Hyperlipidemia, unspecified hyperlipidemia type  Comments:  continue simvastatin at current dose.  Orders:  -     Comprehensive metabolic panel; Future  -     Lipid Panel w/ Chol/HDL Ratio; Future    6. Essential hypertension  Comments:  continue amlodipine-losartan at current dose.  Orders:  -     Urinalysis With Culture If Indicated -; Future  -     CBC No Differential; Future  -     Comprehensive metabolic panel; Future  -     Lipid Panel w/ Chol/HDL Ratio; Future  -     Urinalysis With Culture If Indicated -; Future  -     CBC No Differential; Future    7. Encounter for gynecological examination (general) (routine) without abnormal findings              Follow Up     Return in about 6 months (around 3/17/2022) for Next scheduled follow up.    Patient was given instructions and counseling regarding her condition or for health maintenance advice. Please see specific information pulled into the AVS if appropriate.

## 2021-09-20 LAB
CONV .: NORMAL
CYTOLOGIST CVX/VAG CYTO: NORMAL
CYTOLOGY CVX/VAG DOC CYTO: NORMAL
CYTOLOGY CVX/VAG DOC THIN PREP: NORMAL
DX ICD CODE: NORMAL
HIV 1 & 2 AB SER-IMP: NORMAL
OTHER STN SPEC: NORMAL
STAT OF ADQ CVX/VAG CYTO-IMP: NORMAL

## 2021-10-14 ENCOUNTER — TELEPHONE (OUTPATIENT)
Dept: FAMILY MEDICINE CLINIC | Facility: CLINIC | Age: 76
End: 2021-10-14

## 2021-10-14 NOTE — TELEPHONE ENCOUNTER
Spoke with patient, let her know she can come in at any time to receive her flu shot, pt verbalized understanding.

## 2021-10-14 NOTE — TELEPHONE ENCOUNTER
----- Message from Bhakti Travis sent at 10/14/2021  8:40 AM EDT -----  Regarding: Flu shot  Lianna,  when I saw Zaid we talked about me getting a flu shot and she said get it from you and I guess we forgot so can I stop in and get it tomorrow????????

## 2021-10-15 ENCOUNTER — HOSPITAL ENCOUNTER (OUTPATIENT)
Dept: MAMMOGRAPHY | Facility: HOSPITAL | Age: 76
Discharge: HOME | End: 2021-10-15

## 2021-10-15 ENCOUNTER — HOSPITAL ENCOUNTER (OUTPATIENT)
Dept: BONE DENSITY | Facility: HOSPITAL | Age: 76
Discharge: HOME | End: 2021-10-15

## 2021-10-15 ENCOUNTER — CLINICAL SUPPORT (OUTPATIENT)
Dept: FAMILY MEDICINE CLINIC | Facility: CLINIC | Age: 76
End: 2021-10-15

## 2021-10-15 DIAGNOSIS — R92.8 ABNORMAL MAMMOGRAM OF LEFT BREAST: Primary | ICD-10-CM

## 2021-10-15 DIAGNOSIS — Z23 NEED FOR INFLUENZA VACCINATION: Primary | ICD-10-CM

## 2021-10-15 DIAGNOSIS — Z78.0 POST-MENOPAUSAL: ICD-10-CM

## 2021-10-15 DIAGNOSIS — Z12.31 SCREENING MAMMOGRAM, ENCOUNTER FOR: ICD-10-CM

## 2021-10-15 PROCEDURE — 77067 SCR MAMMO BI INCL CAD: CPT

## 2021-10-15 PROCEDURE — G0008 ADMIN INFLUENZA VIRUS VAC: HCPCS | Performed by: NURSE PRACTITIONER

## 2021-10-15 PROCEDURE — 90662 IIV NO PRSV INCREASED AG IM: CPT | Performed by: NURSE PRACTITIONER

## 2021-10-15 PROCEDURE — 77063 BREAST TOMOSYNTHESIS BI: CPT

## 2021-10-15 PROCEDURE — 77080 DXA BONE DENSITY AXIAL: CPT

## 2021-11-02 ENCOUNTER — TELEPHONE (OUTPATIENT)
Dept: FAMILY MEDICINE CLINIC | Facility: CLINIC | Age: 76
End: 2021-11-02

## 2021-11-02 NOTE — TELEPHONE ENCOUNTER
----- Message from Bhakti Travis sent at 11/2/2021  8:54 AM EDT -----  Regarding: Mammo  I already had the mammo and have a follow up ultrasound.  Is that scheduled mammo a mistake?????

## 2021-11-02 NOTE — TELEPHONE ENCOUNTER
Pt left a message stating she didn't understand why she was getting another mammogram, I informed pt that its a diagnostic mammogram for only the left side and a ultrasound due to the findings of the regular mammogram. She voiced understanding at that time.

## 2021-11-04 ENCOUNTER — HOSPITAL ENCOUNTER (OUTPATIENT)
Dept: ULTRASOUND IMAGING | Facility: HOSPITAL | Age: 76
Discharge: HOME | End: 2021-11-04

## 2021-11-04 ENCOUNTER — HOSPITAL ENCOUNTER (OUTPATIENT)
Dept: MAMMOGRAPHY | Facility: HOSPITAL | Age: 76
Discharge: HOME | End: 2021-11-04

## 2021-11-04 DIAGNOSIS — R92.8 ABNORMAL MAMMOGRAM OF LEFT BREAST: ICD-10-CM

## 2021-11-04 PROCEDURE — 77065 DX MAMMO INCL CAD UNI: CPT

## 2021-11-04 PROCEDURE — G0279 TOMOSYNTHESIS, MAMMO: HCPCS

## 2021-11-29 DIAGNOSIS — E78.2 MIXED HYPERLIPIDEMIA: Primary | ICD-10-CM

## 2021-11-29 RX ORDER — SIMVASTATIN 20 MG
20 TABLET ORAL NIGHTLY
Qty: 90 TABLET | Refills: 1 | Status: SHIPPED | OUTPATIENT
Start: 2021-11-29 | End: 2022-02-18 | Stop reason: SDUPTHER

## 2021-11-29 RX ORDER — LEVOTHYROXINE SODIUM 100 MCG
100 TABLET ORAL DAILY
Qty: 30 TABLET | Refills: 2 | Status: SHIPPED | OUTPATIENT
Start: 2021-11-29 | End: 2021-12-01 | Stop reason: SDUPTHER

## 2021-12-01 RX ORDER — LEVOTHYROXINE SODIUM 100 MCG
100 TABLET ORAL DAILY
Qty: 30 TABLET | Refills: 2 | Status: SHIPPED | OUTPATIENT
Start: 2021-12-01 | End: 2022-02-18 | Stop reason: SDUPTHER

## 2021-12-03 RX ORDER — LEVOTHYROXINE SODIUM 100 MCG
100 TABLET ORAL DAILY
Qty: 30 TABLET | Refills: 2 | Status: CANCELLED | OUTPATIENT
Start: 2021-12-03

## 2022-02-18 DIAGNOSIS — E78.2 MIXED HYPERLIPIDEMIA: ICD-10-CM

## 2022-02-18 RX ORDER — SIMVASTATIN 20 MG
20 TABLET ORAL NIGHTLY
Qty: 90 TABLET | Refills: 1 | Status: SHIPPED | OUTPATIENT
Start: 2022-02-18 | End: 2022-03-18 | Stop reason: SDUPTHER

## 2022-02-18 RX ORDER — LEVOTHYROXINE SODIUM 100 MCG
100 TABLET ORAL DAILY
Qty: 30 TABLET | Refills: 3 | Status: SHIPPED | OUTPATIENT
Start: 2022-02-18 | End: 2022-02-21 | Stop reason: SDUPTHER

## 2022-02-18 NOTE — TELEPHONE ENCOUNTER
----- Message from Bhakti Travis sent at 2/18/2022 10:39 AM EST -----  Regarding: Rx needs refill  Lianna. I have switched pharmacy to [facility] and need two meds called in levothyroxine 100mcg and Simvastatin 20 mg. The pharmacy number is [phone]  Thanks so much. Bhakti Travis

## 2022-02-21 DIAGNOSIS — E03.9 HYPOTHYROIDISM, UNSPECIFIED TYPE: Primary | ICD-10-CM

## 2022-02-21 RX ORDER — LEVOTHYROXINE SODIUM 0.1 MG/1
100 TABLET ORAL DAILY
Qty: 30 TABLET | Refills: 3 | Status: SHIPPED | OUTPATIENT
Start: 2022-02-21 | End: 2022-03-18 | Stop reason: SDUPTHER

## 2022-03-18 ENCOUNTER — OFFICE VISIT (OUTPATIENT)
Dept: FAMILY MEDICINE CLINIC | Facility: CLINIC | Age: 77
End: 2022-03-18

## 2022-03-18 VITALS
OXYGEN SATURATION: 97 % | WEIGHT: 209.4 LBS | SYSTOLIC BLOOD PRESSURE: 148 MMHG | HEART RATE: 87 BPM | TEMPERATURE: 96.6 F | RESPIRATION RATE: 16 BRPM | HEIGHT: 64 IN | DIASTOLIC BLOOD PRESSURE: 70 MMHG | BODY MASS INDEX: 35.75 KG/M2

## 2022-03-18 DIAGNOSIS — Z11.59 NEED FOR HEPATITIS C SCREENING TEST: ICD-10-CM

## 2022-03-18 DIAGNOSIS — E78.2 MIXED HYPERLIPIDEMIA: ICD-10-CM

## 2022-03-18 DIAGNOSIS — I10 ESSENTIAL HYPERTENSION: Primary | ICD-10-CM

## 2022-03-18 DIAGNOSIS — E03.9 HYPOTHYROIDISM, UNSPECIFIED TYPE: ICD-10-CM

## 2022-03-18 PROCEDURE — 1159F MED LIST DOCD IN RCRD: CPT | Performed by: NURSE PRACTITIONER

## 2022-03-18 PROCEDURE — G0439 PPPS, SUBSEQ VISIT: HCPCS | Performed by: NURSE PRACTITIONER

## 2022-03-18 PROCEDURE — 99214 OFFICE O/P EST MOD 30 MIN: CPT | Performed by: NURSE PRACTITIONER

## 2022-03-18 PROCEDURE — 1170F FXNL STATUS ASSESSED: CPT | Performed by: NURSE PRACTITIONER

## 2022-03-18 RX ORDER — DIFLUPREDNATE OPHTHALMIC 0.5 MG/ML
EMULSION OPHTHALMIC
COMMUNITY
Start: 2022-03-14 | End: 2023-02-09

## 2022-03-18 RX ORDER — SIMVASTATIN 20 MG
20 TABLET ORAL NIGHTLY
Qty: 90 TABLET | Refills: 1 | Status: SHIPPED | OUTPATIENT
Start: 2022-03-18 | End: 2022-12-12

## 2022-03-18 RX ORDER — AMLODIPINE AND VALSARTAN 10; 320 MG/1; MG/1
1 TABLET ORAL DAILY
Qty: 90 TABLET | Refills: 1 | Status: SHIPPED | OUTPATIENT
Start: 2022-03-18 | End: 2022-09-01

## 2022-03-18 RX ORDER — LEVOTHYROXINE SODIUM 0.1 MG/1
100 TABLET ORAL DAILY
Qty: 90 TABLET | Refills: 1 | Status: SHIPPED | OUTPATIENT
Start: 2022-03-18 | End: 2022-09-01

## 2022-03-18 NOTE — PROGRESS NOTES
The ABCs of the Annual Wellness Visit  Subsequent Medicare Wellness Visit    Chief Complaint   Patient presents with   • Medicare Wellness-subsequent   • Hypothyroidism   • Hypertension   • Hyperlipidemia      Subjective    History of Present Illness:  Bhakti Travis is a 76 y.o. female who presents for a Subsequent Medicare Wellness Visit.  Hypertension:  Blood pressure is 148/70    Hypothyroidism:   Doing well on medication.    Hyperlipidemia:  Not having side effects from Zocor    Has gained some weight pounds but has been eating ice cream.  Has gotten 7  Pounds and going to start walking and cutting back on ice cream    The following portions of the patient's history were reviewed and   updated as appropriate: allergies, current medications, past family history, past medical history, past social history, past surgical history and problem list.    Compared to one year ago, the patient feels her physical   health is the same.    Compared to one year ago, the patient feels her mental   health is better.    Recent Hospitalizations:  She was not admitted to the hospital during the last year.       Current Medical Providers:  Patient Care Team:  Zaid Kapoor APRN as PCP - General (Nurse Practitioner)    Outpatient Medications Prior to Visit   Medication Sig Dispense Refill   • aspirin 81 MG chewable tablet Chew 81 mg Daily.     • cloNIDine (CATAPRES) 0.1 MG tablet Take 1 tablet by mouth Daily As Needed for High Blood Pressure (for retained blood pressure over 170/90). 30 tablet 0   • amLODIPine-valsartan (Exforge)  MG per tablet Take 1 tablet by mouth Daily. 30 tablet 11   • levothyroxine (Synthroid) 100 MCG tablet Take 1 tablet by mouth Daily. 30 tablet 3   • simvastatin (ZOCOR) 20 MG tablet Take 1 tablet by mouth Every Night. 90 tablet 1   • difluprednate (DUREZOL) 0.05 % ophthalmic emulsion INSTILL 1 DROP IN RIGHT EYE THREE TIMES DAILY       No facility-administered medications prior to visit.       No  "opioid medication identified on active medication list. I have reviewed chart for other potential  high risk medication/s and harmful drug interactions in the elderly.          Aspirin is on active medication list. Aspirin use is indicated based on review of current medical condition/s. Pros and cons of this therapy have been discussed today. Benefits of this medication outweigh potential harm.  Patient has been encouraged to continue taking this medication.  .      Patient Active Problem List   Diagnosis   • Essential hypertension   • Hyperlipemia   • Hypothyroidism   • Kidney stones   • Pancreatitis   • Impaired fasting glucose   • PVC (premature ventricular contraction)   • SOB (shortness of breath)     Advance Care Planning  Advance Directive is not on file.  ACP discussion was held with the patient during this visit. Patient does not have an advance directive, information provided.          Objective    Vitals:    22 0929   BP: 148/70   BP Location: Right arm   Patient Position: Sitting   Cuff Size: Large Adult   Pulse: 87   Resp: 16   Temp: 96.6 °F (35.9 °C)   TempSrc: Infrared   SpO2: 97%   Weight: 95 kg (209 lb 6.4 oz)   Height: 162.6 cm (64.02\")   PainSc: 0-No pain     BMI Readings from Last 1 Encounters:   22 35.93 kg/m²   BMI is above normal parameters. Recommendations include: nutrition counseling    Does the patient have evidence of cognitive impairment? No    Physical Exam            HEALTH RISK ASSESSMENT    Smoking Status:  Social History     Tobacco Use   Smoking Status Former Smoker   • Packs/day: 1.00   • Years: 25.00   • Pack years: 25.00   • Start date: 3/1/1966   • Quit date: 1995   • Years since quittin.2   Smokeless Tobacco Never Used   Tobacco Comment    quit 25 years ago     Alcohol Consumption:  Social History     Substance and Sexual Activity   Alcohol Use Never     Fall Risk Screen:    STEADI Fall Risk Assessment was completed, and patient is at LOW risk for " falls.Assessment completed on:3/18/2022    Depression Screening:  PHQ-2/PHQ-9 Depression Screening 3/18/2022   Retired PHQ-9 Total Score -   Retired Total Score -   Little Interest or Pleasure in Doing Things 0-->not at all   Feeling Down, Depressed or Hopeless 0-->not at all   PHQ-9: Brief Depression Severity Measure Score 0       Health Habits and Functional and Cognitive Screening:  Functional & Cognitive Status 3/18/2022   Do you have difficulty preparing food and eating? No   Do you have difficulty bathing yourself, getting dressed or grooming yourself? No   Do you have difficulty using the toilet? No   Do you have difficulty moving around from place to place? No   Do you have trouble with steps or getting out of a bed or a chair? No   Current Diet Well Balanced Diet   Dental Exam Up to date   Eye Exam Up to date   Exercise (times per week) 2 times per week   Current Exercises Include Walking   Do you need help using the phone?  No   Are you deaf or do you have serious difficulty hearing?  No   Do you need help with transportation? No   Do you need help shopping? No   Do you need help preparing meals?  No   Do you need help with housework?  No   Do you need help with laundry? No   Do you need help taking your medications? No   Do you need help managing money? No   Do you ever drive or ride in a car without wearing a seat belt? No   Have you felt unusual stress, anger or loneliness in the last month? No   Who do you live with? Alone   If you need help, do you have trouble finding someone available to you? No   Have you been bothered in the last four weeks by sexual problems? No   Do you have difficulty concentrating, remembering or making decisions? No       Age-appropriate Screening Schedule:  Refer to the list below for future screening recommendations based on patient's age, sex and/or medical conditions. Orders for these recommended tests are listed in the plan section. The patient has been provided with a  written plan.    Health Maintenance   Topic Date Due   • ZOSTER VACCINE (2 of 2) 03/18/2022 (Originally 12/10/2019)   • TDAP/TD VACCINES (1 - Tdap) 03/18/2023 (Originally 3/19/1964)   • LIPID PANEL  09/17/2022   • DXA SCAN  10/15/2023   • INFLUENZA VACCINE  Completed              Assessment/Plan   CMS Preventative Services Quick Reference  Risk Factors Identified During Encounter  Obesity/Overweight   The above risks/problems have been discussed with the patient.  Follow up actions/plans if indicated are seen below in the Assessment/Plan Section.  Pertinent information has been shared with the patient in the After Visit Summary.    Diagnoses and all orders for this visit:    1. Essential hypertension (Primary)  -     amLODIPine-valsartan (Exforge)  MG per tablet; Take 1 tablet by mouth Daily.  Dispense: 90 tablet; Refill: 1  -     Comprehensive Metabolic Panel; Future  -     Lipid Panel With / Chol / HDL Ratio; Future  -     Urinalysis With Culture If Indicated -; Future  -     CBC (No Diff); Future  -     Lipid Panel With / Chol / HDL Ratio; Future  -     Comprehensive Metabolic Panel; Future  -     Urinalysis With Culture If Indicated -; Future  -     CBC (No Diff); Future    2. Hypothyroidism, unspecified type  -     levothyroxine (Synthroid) 100 MCG tablet; Take 1 tablet by mouth Daily.  Dispense: 90 tablet; Refill: 1  -     TSH; Future  -     TSH; Future    3. Mixed hyperlipidemia  -     simvastatin (ZOCOR) 20 MG tablet; Take 1 tablet by mouth Every Night.  Dispense: 90 tablet; Refill: 1    4. Need for hepatitis C screening test  -     Hepatitis C antibody; Future        Follow Up:   No follow-ups on file.     An After Visit Summary and PPPS were made available to the patient.

## 2022-04-11 ENCOUNTER — LAB (OUTPATIENT)
Dept: LAB | Facility: HOSPITAL | Age: 77
End: 2022-04-11

## 2022-04-11 DIAGNOSIS — I10 ESSENTIAL HYPERTENSION: ICD-10-CM

## 2022-04-11 DIAGNOSIS — E03.9 HYPOTHYROIDISM, UNSPECIFIED TYPE: ICD-10-CM

## 2022-04-11 DIAGNOSIS — Z11.59 NEED FOR HEPATITIS C SCREENING TEST: ICD-10-CM

## 2022-04-11 LAB
ALBUMIN SERPL-MCNC: 4.8 G/DL (ref 3.5–5.2)
ALBUMIN/GLOB SERPL: 1.9 G/DL
ALP SERPL-CCNC: 74 U/L (ref 39–117)
ALT SERPL W P-5'-P-CCNC: 22 U/L (ref 1–33)
ANION GAP SERPL CALCULATED.3IONS-SCNC: 10.1 MMOL/L (ref 5–15)
AST SERPL-CCNC: 19 U/L (ref 1–32)
BACTERIA UR QL AUTO: NORMAL /HPF
BILIRUB SERPL-MCNC: 0.2 MG/DL (ref 0–1.2)
BILIRUB UR QL STRIP: NEGATIVE
BUN SERPL-MCNC: 19 MG/DL (ref 8–23)
BUN/CREAT SERPL: 20.4 (ref 7–25)
CALCIUM SPEC-SCNC: 10.5 MG/DL (ref 8.6–10.5)
CHLORIDE SERPL-SCNC: 105 MMOL/L (ref 98–107)
CHOLEST SERPL-MCNC: 167 MG/DL (ref 0–200)
CLARITY UR: CLEAR
CO2 SERPL-SCNC: 27.9 MMOL/L (ref 22–29)
COLOR UR: YELLOW
CREAT SERPL-MCNC: 0.93 MG/DL (ref 0.57–1)
DEPRECATED RDW RBC AUTO: 41 FL (ref 37–54)
EGFRCR SERPLBLD CKD-EPI 2021: 63.8 ML/MIN/1.73
ERYTHROCYTE [DISTWIDTH] IN BLOOD BY AUTOMATED COUNT: 13.6 % (ref 12.3–15.4)
GLOBULIN UR ELPH-MCNC: 2.5 GM/DL
GLUCOSE SERPL-MCNC: 113 MG/DL (ref 65–99)
GLUCOSE UR STRIP-MCNC: NEGATIVE MG/DL
HCT VFR BLD AUTO: 41.2 % (ref 34–46.6)
HCV AB SER DONR QL: NORMAL
HDLC SERPL QL: 2.74
HDLC SERPL-MCNC: 61 MG/DL (ref 40–60)
HGB BLD-MCNC: 13.8 G/DL (ref 12–15.9)
HGB UR QL STRIP.AUTO: NEGATIVE
HYALINE CASTS UR QL AUTO: NORMAL /LPF
KETONES UR QL STRIP: NEGATIVE
LDLC SERPL CALC-MCNC: 90 MG/DL (ref 0–100)
LEUKOCYTE ESTERASE UR QL STRIP.AUTO: ABNORMAL
MCH RBC QN AUTO: 28.3 PG (ref 26.6–33)
MCHC RBC AUTO-ENTMCNC: 33.5 G/DL (ref 31.5–35.7)
MCV RBC AUTO: 84.4 FL (ref 79–97)
NITRITE UR QL STRIP: NEGATIVE
PH UR STRIP.AUTO: 5.5 [PH] (ref 5–8)
PLATELET # BLD AUTO: 192 10*3/MM3 (ref 140–450)
PMV BLD AUTO: 11.6 FL (ref 6–12)
POTASSIUM SERPL-SCNC: 4.8 MMOL/L (ref 3.5–5.2)
PROT SERPL-MCNC: 7.3 G/DL (ref 6–8.5)
PROT UR QL STRIP: NEGATIVE
RBC # BLD AUTO: 4.88 10*6/MM3 (ref 3.77–5.28)
RBC # UR STRIP: NORMAL /HPF
REF LAB TEST METHOD: NORMAL
SODIUM SERPL-SCNC: 143 MMOL/L (ref 136–145)
SP GR UR STRIP: 1.02 (ref 1–1.03)
SQUAMOUS #/AREA URNS HPF: NORMAL /HPF
TRIGL SERPL-MCNC: 84 MG/DL (ref 0–150)
TSH SERPL DL<=0.05 MIU/L-ACNC: 3.47 UIU/ML (ref 0.27–4.2)
UROBILINOGEN UR QL STRIP: ABNORMAL
VLDLC SERPL-MCNC: 16 MG/DL (ref 5–40)
WBC # UR STRIP: NORMAL /HPF
WBC NRBC COR # BLD: 9.22 10*3/MM3 (ref 3.4–10.8)

## 2022-04-11 PROCEDURE — 81001 URINALYSIS AUTO W/SCOPE: CPT

## 2022-04-11 PROCEDURE — 85027 COMPLETE CBC AUTOMATED: CPT

## 2022-04-11 PROCEDURE — 86803 HEPATITIS C AB TEST: CPT

## 2022-04-11 PROCEDURE — 80061 LIPID PANEL: CPT

## 2022-04-11 PROCEDURE — 36415 COLL VENOUS BLD VENIPUNCTURE: CPT

## 2022-04-11 PROCEDURE — 84443 ASSAY THYROID STIM HORMONE: CPT

## 2022-04-11 PROCEDURE — 80053 COMPREHEN METABOLIC PANEL: CPT

## 2022-04-12 ENCOUNTER — PATIENT MESSAGE (OUTPATIENT)
Dept: FAMILY MEDICINE CLINIC | Facility: CLINIC | Age: 77
End: 2022-04-12

## 2022-04-18 ENCOUNTER — TELEPHONE (OUTPATIENT)
Dept: FAMILY MEDICINE CLINIC | Facility: CLINIC | Age: 77
End: 2022-04-18

## 2022-04-18 NOTE — TELEPHONE ENCOUNTER
Pt called complaining because [facility] lab was not added to labs on last visit and that she had to pay a 5.00 copay and didn't feel like anything was done.  I apolgized that the labs were not ordered and walked her through her 2 visits - AWV and office visit.  She states she feels like she really isnt getting heard or taken care of in a manner that she finds agreeable - I offered to find her another provider that she may feel a bit more satisfied with and she said she would need to think about it

## 2022-09-01 DIAGNOSIS — E03.9 HYPOTHYROIDISM, UNSPECIFIED TYPE: ICD-10-CM

## 2022-09-01 DIAGNOSIS — I10 ESSENTIAL HYPERTENSION: ICD-10-CM

## 2022-09-01 RX ORDER — LEVOTHYROXINE SODIUM 100 MCG
TABLET ORAL
Qty: 90 TABLET | Refills: 1 | Status: SHIPPED | OUTPATIENT
Start: 2022-09-01 | End: 2022-09-07 | Stop reason: SDUPTHER

## 2022-09-01 RX ORDER — AMLODIPINE AND VALSARTAN 10; 320 MG/1; MG/1
TABLET ORAL
Qty: 90 TABLET | Refills: 1 | Status: SHIPPED | OUTPATIENT
Start: 2022-09-01 | End: 2023-02-27

## 2022-09-02 ENCOUNTER — LAB (OUTPATIENT)
Dept: LAB | Facility: HOSPITAL | Age: 77
End: 2022-09-02

## 2022-09-02 DIAGNOSIS — I10 ESSENTIAL HYPERTENSION: ICD-10-CM

## 2022-09-02 DIAGNOSIS — R73.01 IMPAIRED FASTING GLUCOSE: ICD-10-CM

## 2022-09-02 DIAGNOSIS — E03.9 HYPOTHYROIDISM, UNSPECIFIED TYPE: ICD-10-CM

## 2022-09-02 LAB
ALBUMIN SERPL-MCNC: 4.8 G/DL (ref 3.5–5.2)
ALBUMIN/GLOB SERPL: 1.9 G/DL
ALP SERPL-CCNC: 71 U/L (ref 39–117)
ALT SERPL W P-5'-P-CCNC: 24 U/L (ref 1–33)
ANION GAP SERPL CALCULATED.3IONS-SCNC: 10.7 MMOL/L (ref 5–15)
AST SERPL-CCNC: 21 U/L (ref 1–32)
BILIRUB SERPL-MCNC: 0.3 MG/DL (ref 0–1.2)
BUN SERPL-MCNC: 16 MG/DL (ref 8–23)
BUN/CREAT SERPL: 13.8 (ref 7–25)
CALCIUM SPEC-SCNC: 10.3 MG/DL (ref 8.6–10.5)
CHLORIDE SERPL-SCNC: 104 MMOL/L (ref 98–107)
CHOLEST SERPL-MCNC: 163 MG/DL (ref 0–200)
CO2 SERPL-SCNC: 26.3 MMOL/L (ref 22–29)
CREAT SERPL-MCNC: 1.16 MG/DL (ref 0.57–1)
DEPRECATED RDW RBC AUTO: 41.4 FL (ref 37–54)
EGFRCR SERPLBLD CKD-EPI 2021: 49 ML/MIN/1.73
ERYTHROCYTE [DISTWIDTH] IN BLOOD BY AUTOMATED COUNT: 13.4 % (ref 12.3–15.4)
GLOBULIN UR ELPH-MCNC: 2.5 GM/DL
GLUCOSE SERPL-MCNC: 110 MG/DL (ref 65–99)
HBA1C MFR BLD: 6.3 % (ref 4.8–5.6)
HCT VFR BLD AUTO: 41.1 % (ref 34–46.6)
HDLC SERPL QL: 2.67
HDLC SERPL-MCNC: 61 MG/DL (ref 40–60)
HGB BLD-MCNC: 13.6 G/DL (ref 12–15.9)
LDLC SERPL CALC-MCNC: 87 MG/DL (ref 0–100)
MCH RBC QN AUTO: 28 PG (ref 26.6–33)
MCHC RBC AUTO-ENTMCNC: 33.1 G/DL (ref 31.5–35.7)
MCV RBC AUTO: 84.6 FL (ref 79–97)
PLATELET # BLD AUTO: 198 10*3/MM3 (ref 140–450)
PMV BLD AUTO: 12.1 FL (ref 6–12)
POTASSIUM SERPL-SCNC: 5.2 MMOL/L (ref 3.5–5.2)
PROT SERPL-MCNC: 7.3 G/DL (ref 6–8.5)
RBC # BLD AUTO: 4.86 10*6/MM3 (ref 3.77–5.28)
SODIUM SERPL-SCNC: 141 MMOL/L (ref 136–145)
TRIGL SERPL-MCNC: 80 MG/DL (ref 0–150)
TSH SERPL DL<=0.05 MIU/L-ACNC: 5.03 UIU/ML (ref 0.27–4.2)
VLDLC SERPL-MCNC: 15 MG/DL (ref 5–40)
WBC NRBC COR # BLD: 8.98 10*3/MM3 (ref 3.4–10.8)

## 2022-09-02 PROCEDURE — 83036 HEMOGLOBIN GLYCOSYLATED A1C: CPT

## 2022-09-02 PROCEDURE — 84443 ASSAY THYROID STIM HORMONE: CPT

## 2022-09-02 PROCEDURE — 80053 COMPREHEN METABOLIC PANEL: CPT

## 2022-09-02 PROCEDURE — 80061 LIPID PANEL: CPT

## 2022-09-02 PROCEDURE — 85027 COMPLETE CBC AUTOMATED: CPT

## 2022-09-02 PROCEDURE — 36415 COLL VENOUS BLD VENIPUNCTURE: CPT

## 2022-09-07 DIAGNOSIS — E03.9 HYPOTHYROIDISM, UNSPECIFIED TYPE: ICD-10-CM

## 2022-09-07 RX ORDER — LEVOTHYROXINE SODIUM 112 UG/1
112 TABLET ORAL DAILY
Qty: 90 TABLET | Refills: 1 | Status: SHIPPED | OUTPATIENT
Start: 2022-09-07 | End: 2022-09-15

## 2022-09-08 ENCOUNTER — PATIENT MESSAGE (OUTPATIENT)
Dept: FAMILY MEDICINE CLINIC | Facility: CLINIC | Age: 77
End: 2022-09-08

## 2022-09-08 DIAGNOSIS — E03.9 HYPOTHYROIDISM, UNSPECIFIED TYPE: Primary | ICD-10-CM

## 2022-09-08 NOTE — TELEPHONE ENCOUNTER
From: Bhakti Travis  To: GEOVANNA Bajwa  Sent: 9/8/2022 8:45 AM EDT  Subject: Lab draw    Can you please ask Zaid to add a T4 to my TSH test order. Thank you

## 2022-09-09 ENCOUNTER — LAB (OUTPATIENT)
Dept: LAB | Facility: HOSPITAL | Age: 77
End: 2022-09-09

## 2022-09-09 DIAGNOSIS — E03.9 HYPOTHYROIDISM, UNSPECIFIED TYPE: ICD-10-CM

## 2022-09-09 DIAGNOSIS — I10 ESSENTIAL HYPERTENSION: ICD-10-CM

## 2022-09-09 PROCEDURE — 84436 ASSAY OF TOTAL THYROXINE: CPT

## 2022-09-09 PROCEDURE — 36415 COLL VENOUS BLD VENIPUNCTURE: CPT

## 2022-09-09 PROCEDURE — 84443 ASSAY THYROID STIM HORMONE: CPT

## 2022-09-10 LAB
T4 SERPL-MCNC: 6.6 MCG/DL (ref 4.5–11.7)
TSH SERPL DL<=0.05 MIU/L-ACNC: 4.77 UIU/ML (ref 0.27–4.2)

## 2022-09-15 ENCOUNTER — OFFICE VISIT (OUTPATIENT)
Dept: FAMILY MEDICINE CLINIC | Facility: CLINIC | Age: 77
End: 2022-09-15

## 2022-09-15 VITALS
OXYGEN SATURATION: 93 % | WEIGHT: 207.8 LBS | RESPIRATION RATE: 16 BRPM | BODY MASS INDEX: 35.48 KG/M2 | HEIGHT: 64 IN | SYSTOLIC BLOOD PRESSURE: 128 MMHG | HEART RATE: 58 BPM | TEMPERATURE: 96.8 F | DIASTOLIC BLOOD PRESSURE: 74 MMHG

## 2022-09-15 DIAGNOSIS — I10 ESSENTIAL HYPERTENSION: ICD-10-CM

## 2022-09-15 DIAGNOSIS — R73.01 IMPAIRED FASTING GLUCOSE: Primary | ICD-10-CM

## 2022-09-15 DIAGNOSIS — E03.9 ACQUIRED HYPOTHYROIDISM: ICD-10-CM

## 2022-09-15 DIAGNOSIS — M65.30 TRIGGER FINGER OF RIGHT HAND, UNSPECIFIED FINGER: ICD-10-CM

## 2022-09-15 DIAGNOSIS — E78.2 MIXED HYPERLIPIDEMIA: ICD-10-CM

## 2022-09-15 DIAGNOSIS — H57.89 EYE DRAINAGE: ICD-10-CM

## 2022-09-15 PROCEDURE — 99214 OFFICE O/P EST MOD 30 MIN: CPT | Performed by: NURSE PRACTITIONER

## 2022-09-15 RX ORDER — LEVOTHYROXINE SODIUM 100 MCG
TABLET ORAL
COMMUNITY
Start: 2022-09-07 | End: 2023-02-27

## 2022-09-15 NOTE — PROGRESS NOTES
Answers for HPI/ROS submitted by the patient on 9/8/2022  What is the primary reason for your visit?: Physical    Chief Complaint  Hypertension, Hyperlipidemia, Hypothyroidism (Discuss medication), and Back Pain (Tylenol helps it)    Subjective        Bhakti Travis presents to [facility]  History of Present Illness  Hypertension:  Doing well on medication.    Hypothyroidism:  Doing well on medication.  Even thought TSH is elevated would like to keep on same dose for 6 months. Has been taking grapefruit and hasn't been waiting 30 minutes with medication.    Hyperlipidemia:  Doing well on medication.    Continue zocor.    Trigger finger of right hand has noticed on right thumb and 4-5 digit.   Left hand dominant.  Doesn't want to do anything currently.    Back pain:  Doing well on medication.  Tylenol is helping pain.  Doing aqua exercise to help and lose weight.  Pain relieved for 8-9 hours.    Has drainage out on inner canthus of right eye.  Would like to culture and see if an infection.  Hypertension  Pertinent negatives include no chest pain, palpitations or shortness of breath.   Hyperlipidemia  Exacerbating diseases include hypothyroidism. Pertinent negatives include no chest pain or shortness of breath.   Hypothyroidism  Pertinent negatives include no chest pain, coughing, fever, numbness or weakness.   Back Pain  Pertinent negatives include no chest pain, fever, numbness or weakness.         Past History:    Medical History: has a past medical history of Arrhythmia, Essential hypertension, Heart murmur, High blood pressure, High cholesterol, Hyperlipemia, Hypertension, Hypothyroidism (11/13/2020), Kidney stones, Pancreatitis, PVC (premature ventricular contraction) (7/14/2021), and Sleep apnea.     Surgical History: has a past surgical history that includes Appendectomy; Back surgery; Colonoscopy; Spine surgery; and Cataract Extraction (Right, 03/04/2022).     Family  "History: family history includes Breast cancer in her mother; COPD in her father; Hyperlipidemia in her mother; Hypertension in her brother, father, mother, and sister; Stomach cancer (age of onset: 50) in her maternal grandmother.     Social History: reports that she quit smoking about 26 years ago. She started smoking about 56 years ago. She has a 25.00 pack-year smoking history. She has never used smokeless tobacco. She reports that she does not drink alcohol and does not use drugs.    Allergies: Beta adrenergic blockers          Current Outpatient Medications:   •  amLODIPine-valsartan (EXFORGE)  MG per tablet, TAKE 1 TABLET DAILY, Disp: 90 tablet, Rfl: 1  •  aspirin 81 MG chewable tablet, Chew 81 mg Daily., Disp: , Rfl:   •  cloNIDine (CATAPRES) 0.1 MG tablet, Take 1 tablet by mouth Daily As Needed for High Blood Pressure (for retained blood pressure over 170/90)., Disp: 30 tablet, Rfl: 0  •  simvastatin (ZOCOR) 20 MG tablet, Take 1 tablet by mouth Every Night., Disp: 90 tablet, Rfl: 1  •  Synthroid 100 MCG tablet, , Disp: , Rfl:   •  difluprednate (DUREZOL) 0.05 % ophthalmic emulsion, INSTILL 1 DROP IN RIGHT EYE THREE TIMES DAILY, Disp: , Rfl:     Medications Discontinued During This Encounter   Medication Reason   • levothyroxine (Synthroid) 112 MCG tablet *Therapy completed         Review of Systems   Constitutional: Negative for fever.   Respiratory: Negative for cough and shortness of breath.    Cardiovascular: Negative for chest pain, palpitations and leg swelling.   Musculoskeletal: Positive for back pain.   Neurological: Negative for dizziness, weakness, numbness and headache.        Objective         Vitals:    09/15/22 1046   BP: 128/74   BP Location: Right arm   Patient Position: Sitting   Cuff Size: Adult   Pulse: 58   Resp: 16   Temp: 96.8 °F (36 °C)   TempSrc: Infrared   SpO2: 93%   Weight: 94.3 kg (207 lb 12.8 oz)   Height: 162.6 cm (64.02\")     Body mass index is 35.65 kg/m². "         Physical Exam  Vitals reviewed.   Constitutional:       Appearance: Normal appearance. She is well-developed.   HENT:      Head: Normocephalic and atraumatic.      Mouth/Throat:      Pharynx: No oropharyngeal exudate.   Eyes:      Conjunctiva/sclera: Conjunctivae normal.      Pupils: Pupils are equal, round, and reactive to light.   Cardiovascular:      Rate and Rhythm: Normal rate and regular rhythm.      Heart sounds: Normal heart sounds. No murmur heard.    No friction rub. No gallop.   Pulmonary:      Effort: Pulmonary effort is normal.      Breath sounds: Normal breath sounds. No wheezing or rhonchi.   Skin:     General: Skin is warm and dry.   Neurological:      Mental Status: She is alert and oriented to person, place, and time.   Psychiatric:         Mood and Affect: Mood and affect normal.         Behavior: Behavior normal.         Thought Content: Thought content normal.         Judgment: Judgment normal.             Result Review :               Assessment and Plan     Diagnoses and all orders for this visit:    1. Impaired fasting glucose (Primary)  -     Cancel: POC Glycosylated Hemoglobin (Hb A1C)  -     Hemoglobin A1c; Future    2. Mixed hyperlipidemia  -     Lipid Panel With / Chol / HDL Ratio; Future  -     Comprehensive Metabolic Panel; Future    3. Acquired hypothyroidism  -     TSH; Future    4. Essential hypertension  -     Urinalysis With Culture If Indicated -; Future  -     CBC (No Diff); Future    5. Trigger finger of right hand, unspecified finger    6. Eye drainage  -     Wound Culture - Wound, Eye, Right; Future    will continue to watch and will let us know if she wants to pursue.          Follow Up     Return in about 6 months (around 3/15/2023).    Patient was given instructions and counseling regarding her condition or for health maintenance advice. Please see specific information pulled into the AVS if appropriate.

## 2022-10-13 ENCOUNTER — PREP FOR SURGERY (OUTPATIENT)
Dept: OTHER | Facility: HOSPITAL | Age: 77
End: 2022-10-13

## 2022-10-13 ENCOUNTER — OFFICE VISIT (OUTPATIENT)
Dept: ORTHOPEDIC SURGERY | Facility: CLINIC | Age: 77
End: 2022-10-13

## 2022-10-13 VITALS — OXYGEN SATURATION: 99 % | HEART RATE: 68 BPM | WEIGHT: 207 LBS | BODY MASS INDEX: 35.34 KG/M2 | HEIGHT: 64 IN

## 2022-10-13 DIAGNOSIS — M65.311 TRIGGER FINGER OF RIGHT THUMB: Primary | ICD-10-CM

## 2022-10-13 DIAGNOSIS — M65.341 TRIGGER RING FINGER OF RIGHT HAND: ICD-10-CM

## 2022-10-13 DIAGNOSIS — M65.30 TRIGGER FINGER OF RIGHT HAND, UNSPECIFIED FINGER: Primary | ICD-10-CM

## 2022-10-13 PROCEDURE — 99214 OFFICE O/P EST MOD 30 MIN: CPT | Performed by: ORTHOPAEDIC SURGERY

## 2022-10-13 RX ORDER — CEFAZOLIN SODIUM 2 G/100ML
2 INJECTION, SOLUTION INTRAVENOUS ONCE
Status: CANCELLED | OUTPATIENT
Start: 2022-10-13 | End: 2022-10-13

## 2022-10-13 RX ORDER — CEFAZOLIN SODIUM IN 0.9 % NACL 3 G/100 ML
3 INTRAVENOUS SOLUTION, PIGGYBACK (ML) INTRAVENOUS ONCE
Status: CANCELLED | OUTPATIENT
Start: 2022-10-13 | End: 2022-10-13

## 2022-10-13 NOTE — PROGRESS NOTES
"Chief Complaint  Initial Evaluation of the Right Hand     Subjective      Bhakti Travis presents to [facility] for evaluation of the right hand. The patient reports triggering and locking of her thumb and 4th fingers. She denies injury to the fingers. She reports her pain is worse with activity.     Allergies   Allergen Reactions   • Beta Adrenergic Blockers Other (See Comments)     Slows pulse too much, makes her not feel well        Social History     Socioeconomic History   • Marital status: Single   Tobacco Use   • Smoking status: Former     Packs/day: 1.00     Years: 25.00     Pack years: 25.00     Types: Cigarettes     Start date: 3/1/1966     Quit date: 1995     Years since quittin.8   • Smokeless tobacco: Never   • Tobacco comments:     quit 25 years ago   Vaping Use   • Vaping Use: Never used   Substance and Sexual Activity   • Alcohol use: Never   • Drug use: Never   • Sexual activity: Not Currently     Partners: Male        Review of Systems     Objective   Vital Signs:   Pulse 68   Ht 162.6 cm (64\")   Wt 93.9 kg (207 lb)   SpO2 99%   BMI 35.53 kg/m²       Physical Exam  Constitutional:       Appearance: Normal appearance. Patient is well-developed and normal weight.   HENT:      Head: Normocephalic.      Right Ear: Hearing and external ear normal.      Left Ear: Hearing and external ear normal.      Nose: Nose normal.   Eyes:      Conjunctiva/sclera: Conjunctivae normal.   Cardiovascular:      Rate and Rhythm: Normal rate.   Pulmonary:      Effort: Pulmonary effort is normal.      Breath sounds: No wheezing or rales.   Abdominal:      Palpations: Abdomen is soft.      Tenderness: There is no abdominal tenderness.   Musculoskeletal:      Cervical back: Normal range of motion.   Skin:     Findings: No rash.   Neurological:      Mental Status: Patient is alert and oriented to person, place, and time.   Psychiatric:         Mood and Affect: Mood and affect " normal.         Judgment: Judgment normal.       Ortho Exam      Right hand- triggering and locking of the thumb and ring finger. Tender to the A-1 pulley. Neurovascularly intact. Sensation to light touch median, radial, ulnar nerve. Positive AIN, PIN, ulnar nerve. Intact motor function. Positive pulses.       Procedures      Imaging Results (Most Recent)     None           Result Review :     No results found.           Assessment and Plan     Diagnoses and all orders for this visit:    1. Trigger finger of right thumb (Primary)    2. Trigger ring finger of right hand        Discussed the treatment options with the patient, operative vs non-operative. Discussed the risks and benefits of a right thumb and ring trigger finger release. The patient expressed understanding and wished to proceed.       Discussed surgery., Risks/benefits discussed with patient including, but not limited to: infection, bleeding, neurovascular damage, malunion, nonunion, aesthetic deformity, need for further surgery, and death., Discussed with patient the implant type being used during surgery and patient understands and desires to proceed., Surgery pamphlet given. and Call or return if worsening symptoms.    Follow Up     2 weeks postoperatively.        Patient was given instructions and counseling regarding her condition or for health maintenance advice. Please see specific information pulled into the AVS if appropriate.     Scribed for Yvonne Davison MD by Delmi Ballard MA.  10/13/22   15:31 EDT    I have personally performed the services described in this document as scribed by the above individual and it is both accurate and complete. Yvonne Davison MD 10/15/22

## 2022-10-14 PROBLEM — M65.341 TRIGGER RING FINGER OF RIGHT HAND: Status: ACTIVE | Noted: 2022-10-14

## 2022-10-28 ENCOUNTER — PATIENT MESSAGE (OUTPATIENT)
Dept: FAMILY MEDICINE CLINIC | Facility: CLINIC | Age: 77
End: 2022-10-28

## 2022-10-28 ENCOUNTER — HOSPITAL ENCOUNTER (OUTPATIENT)
Dept: MAMMOGRAPHY | Facility: HOSPITAL | Age: 77
Discharge: HOME | End: 2022-10-28
Admitting: NURSE PRACTITIONER

## 2022-10-28 DIAGNOSIS — Z12.31 ENCOUNTER FOR SCREENING MAMMOGRAM FOR MALIGNANT NEOPLASM OF BREAST: Primary | ICD-10-CM

## 2022-10-28 DIAGNOSIS — Z12.31 ENCOUNTER FOR SCREENING MAMMOGRAM FOR MALIGNANT NEOPLASM OF BREAST: ICD-10-CM

## 2022-10-28 PROCEDURE — 77063 BREAST TOMOSYNTHESIS BI: CPT

## 2022-10-28 PROCEDURE — 77067 SCR MAMMO BI INCL CAD: CPT

## 2022-10-31 NOTE — TELEPHONE ENCOUNTER
From: SCOTTIE CASH  To: Bhakti Travis  Sent: 10/28/2022 4:58 PM EDT  Subject: Mammogram results    I am writing you to inform you that your mammogramhas came back and after reviewing it this is what Zaid has to say....     Your mammogram is benign. By guidelines we do not need to continue mammograms however I will be more than happy to place an order to continue for next year if you would like.

## 2022-11-28 ENCOUNTER — TELEPHONE (OUTPATIENT)
Dept: ORTHOPEDIC SURGERY | Facility: CLINIC | Age: 77
End: 2022-11-28

## 2022-11-28 NOTE — TELEPHONE ENCOUNTER
Caller: PATIENT    Relationship to patient: SELF     Best call back number: [phone]    Type of visit: SURGERY    If rescheduling, when is the original appointment: 12.21.22    Additional notes: PATIENT WAS CALLING TO RESCHEDULE HER SURGERY WITH DR. MAX THAT IS CURRENTLY SCHEDULED FOR 12.21.22 DUE TO HAVING TO WORK THAT DAY. I ATTEMPTED TO WARM TRANSFER CALL TO THE OFFICE BUT RECEIVED NO ANSWER. THANK YOU!

## 2022-11-28 NOTE — TELEPHONE ENCOUNTER
ATTEMPTED TO CALL PATIENT, NO ANSWER, LVM FOR PATIENT TO RETURN MY CALL ON MY DIRECT LINE OF [phone] TO GET HER SURGERY RESCHEDULED.

## 2022-12-08 ENCOUNTER — TELEPHONE (OUTPATIENT)
Dept: ORTHOPEDIC SURGERY | Facility: CLINIC | Age: 77
End: 2022-12-08

## 2022-12-08 NOTE — TELEPHONE ENCOUNTER
PER PATIENT HER FINGERS ARE NO LONGER TRIGGERING AND WOULD LIKE TO CANCEL SURGERY FOR 12/28/2022.     RIGHT TRIGGER THUMB AND RIGHT 4TH TRIGGER FINGER RELEASE SCHEDULED FOR 12/28/2022 HAS BEEN CANCELED PER PATIENT'S REQUEST.

## 2022-12-12 DIAGNOSIS — E78.2 MIXED HYPERLIPIDEMIA: ICD-10-CM

## 2022-12-12 RX ORDER — SIMVASTATIN 20 MG
TABLET ORAL
Qty: 90 TABLET | Refills: 1 | Status: SHIPPED | OUTPATIENT
Start: 2022-12-12 | End: 2023-03-17 | Stop reason: SDUPTHER

## 2023-02-09 ENCOUNTER — OFFICE VISIT (OUTPATIENT)
Dept: OBSTETRICS AND GYNECOLOGY | Facility: CLINIC | Age: 78
End: 2023-02-09
Payer: MEDICARE

## 2023-02-09 VITALS
WEIGHT: 211 LBS | SYSTOLIC BLOOD PRESSURE: 115 MMHG | HEART RATE: 81 BPM | BODY MASS INDEX: 36.22 KG/M2 | DIASTOLIC BLOOD PRESSURE: 71 MMHG

## 2023-02-09 DIAGNOSIS — L72.3 SEBACEOUS CYST: ICD-10-CM

## 2023-02-09 DIAGNOSIS — N95.8 GENITOURINARY SYNDROME OF MENOPAUSE: ICD-10-CM

## 2023-02-09 DIAGNOSIS — N90.7 VULVAR CYST: Primary | ICD-10-CM

## 2023-02-09 PROCEDURE — 99203 OFFICE O/P NEW LOW 30 MIN: CPT | Performed by: OBSTETRICS & GYNECOLOGY

## 2023-02-09 RX ORDER — ESTRADIOL 10 UG/1
1 INSERT VAGINAL 2 TIMES WEEKLY
Qty: 8 TABLET | Refills: 11 | Status: SHIPPED | OUTPATIENT
Start: 2023-02-09 | End: 2023-02-10 | Stop reason: SDUPTHER

## 2023-02-09 NOTE — PROGRESS NOTES
GYN new patient    CC: Labial cyst    Tobacco/Nicotine use:  No    HPI:   77 y.o. Contraception or HRT: Post menopausal, using no HRT    Pt has concerns she would like to discuss.      History: PMHx, Meds, Allergies, PSHx, Social Hx, and POBHx all reviewed and updated.  PCP:Zaid Kapoor APRN      Review of Systems     /71   Pulse 81   Wt 95.7 kg (211 lb)   LMP  (LMP Unknown)   Breastfeeding No   BMI 36.22 kg/m²     Physical Exam  Vitals and nursing note reviewed. Exam conducted with a chaperone present.   Genitourinary:     Labia:         Right: Lesion present.       Urethra: No urethral lesion.      Vagina: No lesions ( Severe atrophy).             ASSESSMENT AND PLAN:  Problem Visit    Diagnoses and all orders for this visit:    1. Vulvar cyst (Primary)  Assessment & Plan:  Pt states she has a h/o labial cysts for the past several years  They are currently asymptomatic  Sometimes they get inflamed and painful  The last time they were inflamed was 4 months prior      2. Genitourinary syndrome of menopause  Assessment & Plan:  Patient has severe vulvovaginal atrophy with complete resorption of bilateral labia minora severe clitoral adhesions completely obscuring the clitoral glans.  Counseled the patient at length regarding the risks benefits of localized vaginal estrogen for GSM in order to prevent UTIs and subsequent urosepsis.  All questions answered.    Orders:  -     estradiol (Vagifem) 10 MCG tablet vaginal tablet; Insert 1 tablet into the vagina 2 (Two) Times a Week.  Dispense: 8 tablet; Refill: 11    3. Sebaceous cyst  Assessment & Plan:  Exam consistent with a cluster of 2 to 3 mm sebaceous cyst in the inferior aspect of the right labia majora  There is no inflammation or tenderness on today's exam.  At this time I do not recommend further intervention        Counseling:     Localized vaginal estrogen              Follow Up:  Return for Annual physical.        Moni Rutherford,  MD  02/09/2023

## 2023-02-09 NOTE — ASSESSMENT & PLAN NOTE
Pt states she has a h/o labial cysts for the past several years  They are currently asymptomatic  Sometimes they get inflamed and painful  The last time they were inflamed was 4 months prior

## 2023-02-09 NOTE — ASSESSMENT & PLAN NOTE
Patient has severe vulvovaginal atrophy with complete resorption of bilateral labia minora severe clitoral adhesions completely obscuring the clitoral glans.  Counseled the patient at length regarding the risks benefits of localized vaginal estrogen for GSM in order to prevent UTIs and subsequent urosepsis.  All questions answered.

## 2023-02-09 NOTE — ASSESSMENT & PLAN NOTE
Exam consistent with a cluster of 2 to 3 mm sebaceous cyst in the inferior aspect of the right labia majora  There is no inflammation or tenderness on today's exam.  At this time I do not recommend further intervention

## 2023-02-10 ENCOUNTER — TELEPHONE (OUTPATIENT)
Dept: OBSTETRICS AND GYNECOLOGY | Facility: CLINIC | Age: 78
End: 2023-02-10
Payer: MEDICARE

## 2023-02-10 DIAGNOSIS — N95.8 GENITOURINARY SYNDROME OF MENOPAUSE: ICD-10-CM

## 2023-02-10 RX ORDER — ESTRADIOL 10 UG/1
1 INSERT VAGINAL 2 TIMES WEEKLY
Qty: 8 TABLET | Refills: 11 | Status: SHIPPED | OUTPATIENT
Start: 2023-02-13 | End: 2023-02-14 | Stop reason: SDUPTHER

## 2023-02-10 NOTE — TELEPHONE ENCOUNTER
Pt called requesting that her RX for estradiol be sent to her mail order pharmacy[facility] due to it being cheaper for her. Resent RX.

## 2023-02-14 ENCOUNTER — TELEPHONE (OUTPATIENT)
Dept: OBSTETRICS AND GYNECOLOGY | Facility: CLINIC | Age: 78
End: 2023-02-14
Payer: MEDICARE

## 2023-02-14 DIAGNOSIS — N95.8 GENITOURINARY SYNDROME OF MENOPAUSE: ICD-10-CM

## 2023-02-14 RX ORDER — ESTRADIOL 10 UG/1
1 INSERT VAGINAL 2 TIMES WEEKLY
Qty: 24 TABLET | Refills: 4 | Status: SHIPPED | OUTPATIENT
Start: 2023-02-16

## 2023-02-14 NOTE — TELEPHONE ENCOUNTER
Patient called requesting Rx for Estradiol being sent to Mail order pharmacy.  Rx sent 2/10/23 Estradiol #8 with 11 refills needed to be a 90 days supply.  Recent Rx

## 2023-02-27 DIAGNOSIS — I10 ESSENTIAL HYPERTENSION: ICD-10-CM

## 2023-02-27 RX ORDER — AMLODIPINE AND VALSARTAN 10; 320 MG/1; MG/1
TABLET ORAL
Qty: 90 TABLET | Refills: 1 | Status: SHIPPED | OUTPATIENT
Start: 2023-02-27 | End: 2023-02-28 | Stop reason: SDUPTHER

## 2023-02-27 RX ORDER — LEVOTHYROXINE SODIUM 100 MCG
TABLET ORAL
Qty: 90 TABLET | Refills: 1 | Status: SHIPPED | OUTPATIENT
Start: 2023-02-27

## 2023-02-28 DIAGNOSIS — I10 ESSENTIAL HYPERTENSION: ICD-10-CM

## 2023-02-28 RX ORDER — AMLODIPINE AND VALSARTAN 10; 320 MG/1; MG/1
1 TABLET ORAL DAILY
Qty: 90 TABLET | Refills: 1 | Status: SHIPPED | OUTPATIENT
Start: 2023-02-28

## 2023-03-13 ENCOUNTER — LAB (OUTPATIENT)
Dept: LAB | Facility: HOSPITAL | Age: 78
End: 2023-03-13
Payer: MEDICARE

## 2023-03-13 DIAGNOSIS — I10 ESSENTIAL HYPERTENSION: ICD-10-CM

## 2023-03-13 DIAGNOSIS — E78.2 MIXED HYPERLIPIDEMIA: ICD-10-CM

## 2023-03-13 DIAGNOSIS — E03.9 ACQUIRED HYPOTHYROIDISM: ICD-10-CM

## 2023-03-13 DIAGNOSIS — R73.01 IMPAIRED FASTING GLUCOSE: ICD-10-CM

## 2023-03-13 LAB
ALBUMIN SERPL-MCNC: 4.7 G/DL (ref 3.5–5.2)
ALBUMIN/GLOB SERPL: 1.6 G/DL
ALP SERPL-CCNC: 69 U/L (ref 39–117)
ALT SERPL W P-5'-P-CCNC: 29 U/L (ref 1–33)
ANION GAP SERPL CALCULATED.3IONS-SCNC: 9.4 MMOL/L (ref 5–15)
AST SERPL-CCNC: 21 U/L (ref 1–32)
BACTERIA UR QL AUTO: NORMAL /HPF
BILIRUB SERPL-MCNC: 0.3 MG/DL (ref 0–1.2)
BILIRUB UR QL STRIP: NEGATIVE
BUN SERPL-MCNC: 11 MG/DL (ref 8–23)
BUN/CREAT SERPL: 11.3 (ref 7–25)
CALCIUM SPEC-SCNC: 10.9 MG/DL (ref 8.6–10.5)
CHLORIDE SERPL-SCNC: 105 MMOL/L (ref 98–107)
CHOLEST SERPL-MCNC: 153 MG/DL (ref 0–200)
CLARITY UR: CLEAR
CO2 SERPL-SCNC: 29.6 MMOL/L (ref 22–29)
COLOR UR: YELLOW
CREAT SERPL-MCNC: 0.97 MG/DL (ref 0.57–1)
DEPRECATED RDW RBC AUTO: 42.5 FL (ref 37–54)
EGFRCR SERPLBLD CKD-EPI 2021: 60.7 ML/MIN/1.73
ERYTHROCYTE [DISTWIDTH] IN BLOOD BY AUTOMATED COUNT: 13.5 % (ref 12.3–15.4)
GLOBULIN UR ELPH-MCNC: 2.9 GM/DL
GLUCOSE SERPL-MCNC: 126 MG/DL (ref 65–99)
GLUCOSE UR STRIP-MCNC: NEGATIVE MG/DL
HBA1C MFR BLD: 6.4 % (ref 4.8–5.6)
HCT VFR BLD AUTO: 43.7 % (ref 34–46.6)
HDLC SERPL QL: 2.28
HDLC SERPL-MCNC: 67 MG/DL (ref 40–60)
HGB BLD-MCNC: 14.3 G/DL (ref 12–15.9)
HGB UR QL STRIP.AUTO: NEGATIVE
HYALINE CASTS UR QL AUTO: NORMAL /LPF
KETONES UR QL STRIP: NEGATIVE
LDLC SERPL CALC-MCNC: 71 MG/DL (ref 0–100)
LEUKOCYTE ESTERASE UR QL STRIP.AUTO: NEGATIVE
MCH RBC QN AUTO: 28.4 PG (ref 26.6–33)
MCHC RBC AUTO-ENTMCNC: 32.7 G/DL (ref 31.5–35.7)
MCV RBC AUTO: 86.9 FL (ref 79–97)
NITRITE UR QL STRIP: NEGATIVE
PH UR STRIP.AUTO: 6 [PH] (ref 5–8)
PLATELET # BLD AUTO: 210 10*3/MM3 (ref 140–450)
PMV BLD AUTO: 12.1 FL (ref 6–12)
POTASSIUM SERPL-SCNC: 5 MMOL/L (ref 3.5–5.2)
PROT SERPL-MCNC: 7.6 G/DL (ref 6–8.5)
PROT UR QL STRIP: ABNORMAL
RBC # BLD AUTO: 5.03 10*6/MM3 (ref 3.77–5.28)
RBC # UR STRIP: NORMAL /HPF
REF LAB TEST METHOD: NORMAL
SODIUM SERPL-SCNC: 144 MMOL/L (ref 136–145)
SP GR UR STRIP: 1.02 (ref 1–1.03)
SQUAMOUS #/AREA URNS HPF: NORMAL /HPF
STARCH GRANULES URNS QL MICRO: NORMAL /HPF
TRIGL SERPL-MCNC: 79 MG/DL (ref 0–150)
TSH SERPL DL<=0.05 MIU/L-ACNC: 3.23 UIU/ML (ref 0.27–4.2)
UROBILINOGEN UR QL STRIP: ABNORMAL
VLDLC SERPL-MCNC: 15 MG/DL (ref 5–40)
WBC # UR STRIP: NORMAL /HPF
WBC NRBC COR # BLD: 10.42 10*3/MM3 (ref 3.4–10.8)

## 2023-03-13 PROCEDURE — 85027 COMPLETE CBC AUTOMATED: CPT

## 2023-03-13 PROCEDURE — 81001 URINALYSIS AUTO W/SCOPE: CPT

## 2023-03-13 PROCEDURE — 36415 COLL VENOUS BLD VENIPUNCTURE: CPT

## 2023-03-13 PROCEDURE — 83036 HEMOGLOBIN GLYCOSYLATED A1C: CPT

## 2023-03-13 PROCEDURE — 80053 COMPREHEN METABOLIC PANEL: CPT

## 2023-03-13 PROCEDURE — 80061 LIPID PANEL: CPT

## 2023-03-13 PROCEDURE — 84443 ASSAY THYROID STIM HORMONE: CPT

## 2023-03-17 ENCOUNTER — OFFICE VISIT (OUTPATIENT)
Dept: FAMILY MEDICINE CLINIC | Facility: CLINIC | Age: 78
End: 2023-03-17
Payer: MEDICARE

## 2023-03-17 VITALS
RESPIRATION RATE: 16 BRPM | SYSTOLIC BLOOD PRESSURE: 150 MMHG | TEMPERATURE: 97.1 F | HEIGHT: 64 IN | BODY MASS INDEX: 35.2 KG/M2 | HEART RATE: 87 BPM | DIASTOLIC BLOOD PRESSURE: 56 MMHG | WEIGHT: 206.2 LBS | OXYGEN SATURATION: 95 %

## 2023-03-17 DIAGNOSIS — E03.9 ACQUIRED HYPOTHYROIDISM: ICD-10-CM

## 2023-03-17 DIAGNOSIS — R73.01 IMPAIRED FASTING GLUCOSE: ICD-10-CM

## 2023-03-17 DIAGNOSIS — E78.2 MIXED HYPERLIPIDEMIA: Primary | ICD-10-CM

## 2023-03-17 DIAGNOSIS — K21.9 GASTROESOPHAGEAL REFLUX DISEASE WITHOUT ESOPHAGITIS: ICD-10-CM

## 2023-03-17 DIAGNOSIS — I10 ESSENTIAL HYPERTENSION: ICD-10-CM

## 2023-03-17 PROCEDURE — 3078F DIAST BP <80 MM HG: CPT | Performed by: NURSE PRACTITIONER

## 2023-03-17 PROCEDURE — 3077F SYST BP >= 140 MM HG: CPT | Performed by: NURSE PRACTITIONER

## 2023-03-17 PROCEDURE — 1159F MED LIST DOCD IN RCRD: CPT | Performed by: NURSE PRACTITIONER

## 2023-03-17 PROCEDURE — 99214 OFFICE O/P EST MOD 30 MIN: CPT | Performed by: NURSE PRACTITIONER

## 2023-03-17 PROCEDURE — 1160F RVW MEDS BY RX/DR IN RCRD: CPT | Performed by: NURSE PRACTITIONER

## 2023-03-17 RX ORDER — SIMVASTATIN 20 MG
20 TABLET ORAL NIGHTLY
Qty: 90 TABLET | Refills: 1 | Status: SHIPPED | OUTPATIENT
Start: 2023-03-17

## 2023-03-17 RX ORDER — PANTOPRAZOLE SODIUM 40 MG/1
40 TABLET, DELAYED RELEASE ORAL DAILY
Qty: 90 TABLET | Refills: 1 | Status: SHIPPED | OUTPATIENT
Start: 2023-03-17

## 2023-03-17 RX ORDER — METFORMIN HYDROCHLORIDE 500 MG/1
500 TABLET, EXTENDED RELEASE ORAL
Qty: 30 TABLET | Refills: 2 | Status: SHIPPED | OUTPATIENT
Start: 2023-03-17

## 2023-03-17 NOTE — PROGRESS NOTES
Answers for HPI/ROS submitted by the patient on 3/10/2023  Please describe your symptoms.: Yearly visit  Have you had these symptoms before?: No  How long have you been having these symptoms?: 1-4 days  Please list any medications you are currently taking for this condition.: None  Please describe any probable cause for these symptoms. : None  What is the primary reason for your visit?: Other    Chief Complaint  Heartburn (Would like pantoprazole), Hypertension, Hypothyroidism, and Hyperlipidemia    Subjective        Bhakti Travis presents to [facility]  History of Present Illness  Hypertension:  Doing well on medication.  Elevated 150/56.    Hypothyroidism:  Doing well on medication.      Hyperlipidemia:  Doing well on medication.    Continue zocor.    Heartburn:  Would like to try pantoprazole.  Heartburn  She reports no chest pain or no coughing.   Hypertension  Pertinent negatives include no chest pain, palpitations or shortness of breath.   Hypothyroidism  Pertinent negatives include no chest pain, coughing, fever, numbness or weakness.   Hyperlipidemia  Exacerbating diseases include hypothyroidism. Pertinent negatives include no chest pain or shortness of breath.   Back Pain  Pertinent negatives include no chest pain, fever, numbness or weakness.       The following portions of the patient's history were personally reviewed and updated as appropriate: allergies, current medications, past medical history, past surgical history, past family history, and past social history.     Body mass index is 35.38 kg/m².    Class 2 Severe Obesity (BMI >=35 and <=39.9). Obesity-related health conditions include the following: hypertension. Obesity is unchanged. BMI is is above average; BMI management plan is completed. We discussed portion control and increasing exercise.      Past History:    Medical History: has a past medical history of Arrhythmia, Essential hypertension, Heart murmur,  High blood pressure, High cholesterol, Hyperlipemia, Hypertension, Hypothyroidism (11/13/2020), Kidney stones, Pancreatitis, PVC (premature ventricular contraction) (7/14/2021), and Sleep apnea.     Surgical History: has a past surgical history that includes Appendectomy; Back surgery; Colonoscopy; Spine surgery; and Cataract Extraction (Right, 03/04/2022).     Family History: family history includes Breast cancer in her mother; COPD in her father; Hyperlipidemia in her mother; Hypertension in her brother, father, mother, and sister; Stomach cancer (age of onset: 50) in her maternal grandmother.     Social History: reports that she quit smoking about 27 years ago. Her smoking use included cigarettes. She started smoking about 57 years ago. She has a 25.00 pack-year smoking history. She has never used smokeless tobacco. She reports that she does not drink alcohol and does not use drugs.    Allergies: Beta adrenergic blockers          Current Outpatient Medications:   •  amLODIPine-valsartan (EXFORGE)  MG per tablet, Take 1 tablet by mouth Daily., Disp: 90 tablet, Rfl: 1  •  aspirin 81 MG chewable tablet, Chew 1 tablet Daily., Disp: , Rfl:   •  cloNIDine (CATAPRES) 0.1 MG tablet, Take 1 tablet by mouth Daily As Needed for High Blood Pressure (for retained blood pressure over 170/90)., Disp: 30 tablet, Rfl: 0  •  estradiol (Vagifem) 10 MCG tablet vaginal tablet, Insert 1 tablet into the vagina 2 (Two) Times a Week. (Patient taking differently: Insert 1 tablet into the vagina As Needed (vaginal dryness). 2x weekly PRN), Disp: 24 tablet, Rfl: 4  •  simvastatin (ZOCOR) 20 MG tablet, Take 1 tablet by mouth Every Night., Disp: 90 tablet, Rfl: 1  •  Synthroid 100 MCG tablet, TAKE 1 TABLET DAILY, Disp: 90 tablet, Rfl: 1  •  metFORMIN ER (GLUCOPHAGE-XR) 500 MG 24 hr tablet, Take 1 tablet by mouth Daily With Breakfast., Disp: 30 tablet, Rfl: 2  •  pantoprazole (Protonix) 40 MG EC tablet, Take 1 tablet by mouth Daily.,  "Disp: 90 tablet, Rfl: 1    Medications Discontinued During This Encounter   Medication Reason   • simvastatin (ZOCOR) 20 MG tablet Reorder         Review of Systems   Constitutional: Negative for fever.   Respiratory: Negative for cough and shortness of breath.    Cardiovascular: Negative for chest pain and palpitations.   Musculoskeletal: Positive for back pain.   Neurological: Negative for weakness and numbness.        Objective         Vitals:    03/17/23 1002   BP: 150/56   BP Location: Right arm   Patient Position: Sitting   Cuff Size: Large Adult   Pulse: 87   Resp: 16   Temp: 97.1 °F (36.2 °C)   TempSrc: Temporal   SpO2: 95%   Weight: 93.5 kg (206 lb 3.2 oz)   Height: 162.6 cm (64.02\")     Body mass index is 35.38 kg/m².         Physical Exam  Vitals reviewed.   Constitutional:       Appearance: Normal appearance. She is well-developed.   HENT:      Head: Normocephalic and atraumatic.      Mouth/Throat:      Pharynx: No oropharyngeal exudate.   Eyes:      Conjunctiva/sclera: Conjunctivae normal.      Pupils: Pupils are equal, round, and reactive to light.   Cardiovascular:      Rate and Rhythm: Normal rate and regular rhythm.      Heart sounds: Normal heart sounds. No murmur heard.    No friction rub. No gallop.   Pulmonary:      Effort: Pulmonary effort is normal.      Breath sounds: Normal breath sounds. No wheezing or rhonchi.   Skin:     General: Skin is warm and dry.   Neurological:      Mental Status: She is alert and oriented to person, place, and time.   Psychiatric:         Mood and Affect: Mood and affect normal.         Behavior: Behavior normal.         Thought Content: Thought content normal.         Judgment: Judgment normal.             Result Review :               Assessment and Plan     Diagnoses and all orders for this visit:    1. Mixed hyperlipidemia (Primary)  Comments:  continue zocor at current dose.  Orders:  -     simvastatin (ZOCOR) 20 MG tablet; Take 1 tablet by mouth Every Night.  " Dispense: 90 tablet; Refill: 1    2. Essential hypertension  Comments:  continue valsartan at current dose.    3. Acquired hypothyroidism  -     TSH; Future    4. Impaired fasting glucose  -     metFORMIN ER (GLUCOPHAGE-XR) 500 MG 24 hr tablet; Take 1 tablet by mouth Daily With Breakfast.  Dispense: 30 tablet; Refill: 2  -     Hemoglobin A1c; Future    5. Gastroesophageal reflux disease without esophagitis  -     pantoprazole (Protonix) 40 MG EC tablet; Take 1 tablet by mouth Daily.  Dispense: 90 tablet; Refill: 1              Follow Up     Return in about 3 months (around 6/17/2023).    Patient was given instructions and counseling regarding her condition or for health maintenance advice. Please see specific information pulled into the AVS if appropriate.

## 2023-04-12 ENCOUNTER — OFFICE VISIT (OUTPATIENT)
Dept: FAMILY MEDICINE CLINIC | Facility: CLINIC | Age: 78
End: 2023-04-12
Payer: MEDICARE

## 2023-04-12 ENCOUNTER — TELEPHONE (OUTPATIENT)
Dept: FAMILY MEDICINE CLINIC | Facility: CLINIC | Age: 78
End: 2023-04-12

## 2023-04-12 VITALS
SYSTOLIC BLOOD PRESSURE: 130 MMHG | RESPIRATION RATE: 16 BRPM | HEIGHT: 64 IN | HEART RATE: 77 BPM | OXYGEN SATURATION: 95 % | WEIGHT: 203.8 LBS | BODY MASS INDEX: 34.79 KG/M2 | TEMPERATURE: 97.3 F | DIASTOLIC BLOOD PRESSURE: 72 MMHG

## 2023-04-12 DIAGNOSIS — Z00.00 MEDICARE ANNUAL WELLNESS VISIT, SUBSEQUENT: ICD-10-CM

## 2023-04-12 DIAGNOSIS — Z51.81 MEDICATION MONITORING ENCOUNTER: Primary | ICD-10-CM

## 2023-04-12 NOTE — PROGRESS NOTES
The ABCs of the Annual Wellness Visit  Subsequent Medicare Wellness Visit    Subjective       Bhakti Travis is a 78 y.o. female who presents for a Subsequent Medicare Wellness Visit.    The following portions of the patient's history were reviewed and   updated as appropriate: allergies, current medications, past family history, past medical history, past social history, past surgical history and problem list.    Compared to one year ago, the patient feels her physical   health is better.    Compared to one year ago, the patient feels her mental   health is better.    Recent Hospitalizations:  She was not admitted to the hospital during the last year.       Current Medical Providers:  Patient Care Team:  Zaid Kapoor APRN as PCP - General (Nurse Practitioner)    Outpatient Medications Prior to Visit   Medication Sig Dispense Refill   • amLODIPine-valsartan (EXFORGE)  MG per tablet Take 1 tablet by mouth Daily. 90 tablet 1   • aspirin 81 MG chewable tablet Chew 1 tablet Daily.     • cloNIDine (CATAPRES) 0.1 MG tablet Take 1 tablet by mouth Daily As Needed for High Blood Pressure (for retained blood pressure over 170/90). 30 tablet 0   • estradiol (Vagifem) 10 MCG tablet vaginal tablet Insert 1 tablet into the vagina 2 (Two) Times a Week. (Patient taking differently: Insert 1 tablet into the vagina As Needed (vaginal dryness). 2x weekly PRN) 24 tablet 4   • metFORMIN ER (GLUCOPHAGE-XR) 500 MG 24 hr tablet Take 1 tablet by mouth Daily With Breakfast. 30 tablet 2   • pantoprazole (Protonix) 40 MG EC tablet Take 1 tablet by mouth Daily. 90 tablet 1   • simvastatin (ZOCOR) 20 MG tablet Take 1 tablet by mouth Every Night. 90 tablet 1   • Synthroid 100 MCG tablet TAKE 1 TABLET DAILY 90 tablet 1     No facility-administered medications prior to visit.       No opioid medication identified on active medication list. I have reviewed chart for other potential  high risk medication/s and harmful drug interactions in  "the elderly.          Aspirin is on active medication list. Aspirin use is indicated based on review of current medical condition/s. Pros and cons of this therapy have been discussed today. Benefits of this medication outweigh potential harm.  Patient has been encouraged to continue taking this medication.  .      Patient Active Problem List   Diagnosis   • Essential hypertension   • Hyperlipemia   • Hypothyroidism   • Kidney stones   • Pancreatitis   • Impaired fasting glucose   • PVC (premature ventricular contraction)   • SOB (shortness of breath)   • Trigger finger of right hand   • Trigger ring finger of right hand   • Vulvar cyst   • Sebaceous cyst   • Genitourinary syndrome of menopause     Advance Care Planning   Advance Care Planning     Advance Directive is not on file.  ACP discussion was held with the patient during this visit. Patient does not have an advance directive, information provided.     Objective    Vitals:    04/12/23 0811   BP: 130/72   BP Location: Right arm   Patient Position: Sitting   Cuff Size: Large Adult   Pulse: 77   Resp: 16   Temp: 97.3 °F (36.3 °C)   TempSrc: Temporal   SpO2: 95%   Weight: 92.4 kg (203 lb 12.8 oz)   Height: 162.6 cm (64.02\")   PainSc: 0-No pain     Estimated body mass index is 34.96 kg/m² as calculated from the following:    Height as of this encounter: 162.6 cm (64.02\").    Weight as of this encounter: 92.4 kg (203 lb 12.8 oz).    BMI is >= 30 and <35. (Class 1 Obesity). The following options were offered after discussion;: weight loss educational material (shared in after visit summary)      Does the patient have evidence of cognitive impairment?   No    Lab Results   Component Value Date    TRIG 79 03/13/2023    HDL 67 (H) 03/13/2023    LDL 71 03/13/2023    VLDL 15 03/13/2023    HGBA1C 6.40 (H) 03/13/2023          HEALTH RISK ASSESSMENT    Smoking Status:  Social History     Tobacco Use   Smoking Status Former   • Packs/day: 1.00   • Years: 25.00   • Pack years: " 25.00   • Types: Cigarettes   • Start date: 3/1/1966   • Quit date: 1995   • Years since quittin.3   Smokeless Tobacco Never   Tobacco Comments    quit 25 years ago     Alcohol Consumption:  Social History     Substance and Sexual Activity   Alcohol Use Never     Fall Risk Screen:    ELIZABETH Fall Risk Assessment was completed, and patient is at LOW risk for falls.Assessment completed on:2023    Depression Screenin/12/2023     8:14 AM   PHQ-2/PHQ-9 Depression Screening   Little Interest or Pleasure in Doing Things 0-->not at all   Feeling Down, Depressed or Hopeless 0-->not at all   PHQ-9: Brief Depression Severity Measure Score 0       Health Habits and Functional and Cognitive Screenin/12/2023     8:14 AM   Functional & Cognitive Status   Do you have difficulty preparing food and eating? No   Do you have difficulty bathing yourself, getting dressed or grooming yourself? No   Do you have difficulty using the toilet? No   Do you have difficulty moving around from place to place? No   Do you have trouble with steps or getting out of a bed or a chair? No   Current Diet Well Balanced Diet   Dental Exam Up to date   Eye Exam Up to date   Exercise (times per week) 3 times per week   Current Exercises Include Cardiovascular Workout   Do you need help using the phone?  No   Are you deaf or do you have serious difficulty hearing?  No   Do you need help with transportation? No   Do you need help shopping? No   Do you need help preparing meals?  No   Do you need help with housework?  No   Do you need help with laundry? No   Do you need help taking your medications? No   Do you need help managing money? No   Do you ever drive or ride in a car without wearing a seat belt? No   Have you felt unusual stress, anger or loneliness in the last month? No   Who do you live with? Alone   If you need help, do you have trouble finding someone available to you? No   Have you been bothered in the last four  weeks by sexual problems? No   Do you have difficulty concentrating, remembering or making decisions? No       Age-appropriate Screening Schedule:  Refer to the list below for future screening recommendations based on patient's age, sex and/or medical conditions. Orders for these recommended tests are listed in the plan section. The patient has been provided with a written plan.    Health Maintenance   Topic Date Due   • ANNUAL WELLNESS VISIT  03/18/2023   • COVID-19 Vaccine (5 - Booster for Moderna series) 04/14/2023 (Originally 12/5/2022)   • ZOSTER VACCINE (2 of 2) 09/15/2043 (Originally 12/10/2019)   • INFLUENZA VACCINE  08/01/2023   • DXA SCAN  10/15/2023   • LIPID PANEL  03/13/2024   • TDAP/TD VACCINES (2 - Td or Tdap) 04/12/2031   • HEPATITIS C SCREENING  Completed   • Pneumococcal Vaccine 65+  Completed   • COLORECTAL CANCER SCREENING  Discontinued                  CMS Preventative Services Quick Reference  Risk Factors Identified During Encounter:    None Identified    The above risks/problems have been discussed with the patient.  Pertinent information has been shared with the patient in the After Visit Summary.    Diagnoses and all orders for this visit:    1. Medication monitoring encounter (Primary)  -     Comprehensive metabolic panel; Future        Follow Up:   Next Medicare Wellness visit to be scheduled in 1 year.      An After Visit Summary and PPPS were made available to the patient.

## 2023-05-22 ENCOUNTER — TRANSCRIBE ORDERS (OUTPATIENT)
Dept: ADMINISTRATIVE | Facility: HOSPITAL | Age: 78
End: 2023-05-22
Payer: MEDICARE

## 2023-05-22 DIAGNOSIS — Z12.31 ENCOUNTER FOR SCREENING MAMMOGRAM FOR BREAST CANCER: Primary | ICD-10-CM

## 2023-06-13 ENCOUNTER — LAB (OUTPATIENT)
Dept: LAB | Facility: HOSPITAL | Age: 78
End: 2023-06-13
Payer: MEDICARE

## 2023-06-13 DIAGNOSIS — Z51.81 MEDICATION MONITORING ENCOUNTER: ICD-10-CM

## 2023-06-13 DIAGNOSIS — R73.01 IMPAIRED FASTING GLUCOSE: ICD-10-CM

## 2023-06-13 DIAGNOSIS — E03.9 ACQUIRED HYPOTHYROIDISM: ICD-10-CM

## 2023-06-13 LAB
ALBUMIN SERPL-MCNC: 4.8 G/DL (ref 3.5–5.2)
ALBUMIN/GLOB SERPL: 1.8 G/DL
ALP SERPL-CCNC: 70 U/L (ref 39–117)
ALT SERPL W P-5'-P-CCNC: 26 U/L (ref 1–33)
ANION GAP SERPL CALCULATED.3IONS-SCNC: 7 MMOL/L (ref 5–15)
AST SERPL-CCNC: 21 U/L (ref 1–32)
BILIRUB SERPL-MCNC: 0.2 MG/DL (ref 0–1.2)
BUN SERPL-MCNC: 16 MG/DL (ref 8–23)
BUN/CREAT SERPL: 12.8 (ref 7–25)
CALCIUM SPEC-SCNC: 10.1 MG/DL (ref 8.6–10.5)
CHLORIDE SERPL-SCNC: 105 MMOL/L (ref 98–107)
CO2 SERPL-SCNC: 29 MMOL/L (ref 22–29)
CREAT SERPL-MCNC: 1.25 MG/DL (ref 0.57–1)
EGFRCR SERPLBLD CKD-EPI 2021: 44.5 ML/MIN/1.73
GLOBULIN UR ELPH-MCNC: 2.7 GM/DL
GLUCOSE SERPL-MCNC: 119 MG/DL (ref 65–99)
HBA1C MFR BLD: 6.4 % (ref 4.8–5.6)
POTASSIUM SERPL-SCNC: 4.9 MMOL/L (ref 3.5–5.2)
PROT SERPL-MCNC: 7.5 G/DL (ref 6–8.5)
SODIUM SERPL-SCNC: 141 MMOL/L (ref 136–145)
TSH SERPL DL<=0.05 MIU/L-ACNC: 2.49 UIU/ML (ref 0.27–4.2)

## 2023-06-13 PROCEDURE — 84443 ASSAY THYROID STIM HORMONE: CPT

## 2023-06-13 PROCEDURE — 83036 HEMOGLOBIN GLYCOSYLATED A1C: CPT

## 2023-06-13 PROCEDURE — 36415 COLL VENOUS BLD VENIPUNCTURE: CPT

## 2023-06-13 PROCEDURE — 80053 COMPREHEN METABOLIC PANEL: CPT

## 2023-06-14 DIAGNOSIS — N18.32 STAGE 3B CHRONIC KIDNEY DISEASE: Primary | ICD-10-CM

## 2023-06-14 DIAGNOSIS — N28.9 FUNCTION KIDNEY DECREASED: Primary | ICD-10-CM

## 2023-06-14 DIAGNOSIS — R73.01 IMPAIRED FASTING GLUCOSE: ICD-10-CM

## 2023-06-14 RX ORDER — DAPAGLIFLOZIN 5 MG/1
5 TABLET, FILM COATED ORAL DAILY
Qty: 90 TABLET | Refills: 1 | Status: SHIPPED | OUTPATIENT
Start: 2023-06-14

## 2023-07-20 ENCOUNTER — LAB (OUTPATIENT)
Dept: LAB | Facility: HOSPITAL | Age: 78
End: 2023-07-20
Payer: MEDICARE

## 2023-07-20 DIAGNOSIS — N28.9 FUNCTION KIDNEY DECREASED: ICD-10-CM

## 2023-07-20 DIAGNOSIS — R73.01 IMPAIRED FASTING GLUCOSE: ICD-10-CM

## 2023-07-20 LAB
ALBUMIN SERPL-MCNC: 4.6 G/DL (ref 3.5–5.2)
ALBUMIN/GLOB SERPL: 1.8 G/DL
ALP SERPL-CCNC: 64 U/L (ref 39–117)
ALT SERPL W P-5'-P-CCNC: 36 U/L (ref 1–33)
ANION GAP SERPL CALCULATED.3IONS-SCNC: 9.8 MMOL/L (ref 5–15)
AST SERPL-CCNC: 27 U/L (ref 1–32)
BILIRUB SERPL-MCNC: 0.3 MG/DL (ref 0–1.2)
BUN SERPL-MCNC: 20 MG/DL (ref 8–23)
BUN/CREAT SERPL: 20.4 (ref 7–25)
CALCIUM SPEC-SCNC: 10.6 MG/DL (ref 8.6–10.5)
CHLORIDE SERPL-SCNC: 106 MMOL/L (ref 98–107)
CO2 SERPL-SCNC: 26.2 MMOL/L (ref 22–29)
CREAT SERPL-MCNC: 0.98 MG/DL (ref 0.57–1)
EGFRCR SERPLBLD CKD-EPI 2021: 59.6 ML/MIN/1.73
GLOBULIN UR ELPH-MCNC: 2.6 GM/DL
GLUCOSE SERPL-MCNC: 129 MG/DL (ref 65–99)
HBA1C MFR BLD: 6.4 % (ref 4.8–5.6)
POTASSIUM SERPL-SCNC: 4.9 MMOL/L (ref 3.5–5.2)
PROT SERPL-MCNC: 7.2 G/DL (ref 6–8.5)
SODIUM SERPL-SCNC: 142 MMOL/L (ref 136–145)

## 2023-07-20 PROCEDURE — 83036 HEMOGLOBIN GLYCOSYLATED A1C: CPT

## 2023-07-20 PROCEDURE — 36415 COLL VENOUS BLD VENIPUNCTURE: CPT

## 2023-07-20 PROCEDURE — 80053 COMPREHEN METABOLIC PANEL: CPT

## 2023-07-26 DIAGNOSIS — I10 ESSENTIAL HYPERTENSION: ICD-10-CM

## 2023-07-26 DIAGNOSIS — E03.9 ACQUIRED HYPOTHYROIDISM: ICD-10-CM

## 2023-07-26 DIAGNOSIS — E78.2 MIXED HYPERLIPIDEMIA: ICD-10-CM

## 2023-07-26 DIAGNOSIS — R73.01 IMPAIRED FASTING GLUCOSE: Primary | ICD-10-CM

## 2023-07-26 DIAGNOSIS — N28.9 FUNCTION KIDNEY DECREASED: ICD-10-CM

## 2023-07-26 DIAGNOSIS — N18.32 STAGE 3B CHRONIC KIDNEY DISEASE: ICD-10-CM

## 2023-08-04 ENCOUNTER — TELEPHONE (OUTPATIENT)
Dept: FAMILY MEDICINE CLINIC | Facility: CLINIC | Age: 78
End: 2023-08-04

## 2023-08-04 NOTE — TELEPHONE ENCOUNTER
Pharmacy Name:  [facility]    Pharmacy representative name: MELANY    Pharmacy representative phone number: [phone]   REFERENCE NUMBER: [id number]    What medication are you calling in regards to: METFORMIN 500 MG ER TABLET    What question does the pharmacy have: MELANY STATED THAT THE PHARMACY IS NEEDING TO VERIFY IF THEY ARE TO FILL THIS MEDICATION     Who is the provider that prescribed the medication: ANA HENRIQUEZ APRN

## 2023-08-20 DIAGNOSIS — K21.9 GASTROESOPHAGEAL REFLUX DISEASE WITHOUT ESOPHAGITIS: ICD-10-CM

## 2023-08-20 DIAGNOSIS — I10 ESSENTIAL HYPERTENSION: ICD-10-CM

## 2023-08-21 RX ORDER — PANTOPRAZOLE SODIUM 40 MG/1
TABLET, DELAYED RELEASE ORAL
Qty: 90 TABLET | Refills: 1 | Status: SHIPPED | OUTPATIENT
Start: 2023-08-21

## 2023-08-21 RX ORDER — AMLODIPINE AND VALSARTAN 10; 320 MG/1; MG/1
TABLET ORAL
Qty: 90 TABLET | Refills: 1 | Status: SHIPPED | OUTPATIENT
Start: 2023-08-21

## 2023-10-19 ENCOUNTER — OFFICE VISIT (OUTPATIENT)
Dept: ORTHOPEDIC SURGERY | Facility: CLINIC | Age: 78
End: 2023-10-19
Payer: MEDICARE

## 2023-10-19 VITALS
OXYGEN SATURATION: 94 % | DIASTOLIC BLOOD PRESSURE: 87 MMHG | WEIGHT: 203 LBS | SYSTOLIC BLOOD PRESSURE: 126 MMHG | HEIGHT: 64 IN | HEART RATE: 70 BPM | BODY MASS INDEX: 34.66 KG/M2

## 2023-10-19 DIAGNOSIS — M54.50 LUMBAR SPINE PAIN: ICD-10-CM

## 2023-10-19 DIAGNOSIS — M25.552 LEFT HIP PAIN: Primary | ICD-10-CM

## 2023-10-19 DIAGNOSIS — M16.12 OSTEOARTHRITIS OF LEFT HIP, UNSPECIFIED OSTEOARTHRITIS TYPE: ICD-10-CM

## 2023-10-19 RX ORDER — MELOXICAM 15 MG/1
15 TABLET ORAL DAILY
Qty: 30 TABLET | Refills: 0 | Status: SHIPPED | OUTPATIENT
Start: 2023-10-19

## 2023-10-19 NOTE — PROGRESS NOTES
"Chief Complaint  Initial Evaluation of the Left Hip     Subjective      Bhakti Travis presents to [facility] for initial evaluation of the left hip. She is not sure if her hip or back.  She has pain in the low back and sometimes she has pain in the groin.  She played pickle ball and had some increase pain. She has difficulty at times with ADL tasks as putting her pants on. She uses or Advil or Tylenol for pain.     Allergies   Allergen Reactions    Beta Adrenergic Blockers Other (See Comments)     Slows pulse too much, makes her not feel well        Social History     Socioeconomic History    Marital status: Single   Tobacco Use    Smoking status: Former     Packs/day: 1.00     Years: 25.00     Additional pack years: 0.00     Total pack years: 25.00     Types: Cigarettes     Start date: 3/1/1966     Quit date: 1995     Years since quittin.8    Smokeless tobacco: Never    Tobacco comments:     quit 25 years ago   Vaping Use    Vaping Use: Never used   Substance and Sexual Activity    Alcohol use: Never    Drug use: Never    Sexual activity: Not Currently     Partners: Male        I reviewed the patient's chief complaint, history of present illness, review of systems, past medical history, surgical history, family history, social history, medications, and allergy list.     Review of Systems     Constitutional: Denies fevers, chills, weight loss  Cardiovascular: Denies chest pain, shortness of breath  Skin: Denies rashes, acute skin changes  Neurologic: Denies headache, loss of consciousness        Vital Signs:   /87   Pulse 70   Ht 162.6 cm (64\")   Wt 92.1 kg (203 lb)   SpO2 94%   BMI 34.84 kg/m²          Physical Exam  General: Alert. No acute distress    Ortho Exam        LEFT HIP  No skin discoloration, atrophy or swelling. Positive EHL, FHL, GS, and TA. Sensation intact to all 5 nerves of the foot. Negative straight leg raise. Leg lengths appear equal. " Ambulates with Non-antalgic gait Negative Frederick. Negative Richard. Good strength to hamstrings, quadriceps, dorsiflexors, and plantar flexors. Knee Extensor Mechanism  intact        Procedures      Imaging Results (Most Recent)       Procedure Component Value Units Date/Time    XR Hip With or Without Pelvis 2 - 3 View Left [[id number]] Resulted: 10/19/23 1306     Updated: 10/19/23 1309             Result Review :     X-Ray Report:  Left hip X-Ray  Indication: Evaluation of the left hip  AP/Lateral view(s)  Findings: Medium arthritis with joint space narrowing and osteophyte formation.   Prior studies available for comparison: no             Assessment and Plan     Diagnoses and all orders for this visit:    1. Left hip pain (Primary)  -     XR Hip With or Without Pelvis 2 - 3 View Left    2. Osteoarthritis of left hip, unspecified osteoarthritis type    3. Lumbar spine pain        Discussed the treatment plan with the patient. I reviewed the X-rays that were obtained today with the patient.     HEP exercises.  Modify activity as needed.     Prescribed physical therapy for low back pain.     Prescribed anti inflammatory. Do not take with Ibuprofen.       Call or return if worsening symptoms.    Follow Up     PRN      Patient was given instructions and counseling regarding her condition or for health maintenance advice. Please see specific information pulled into the AVS if appropriate.     Scribed for Yvonne Davison MD by Kati Bhatti MA.  10/19/23   13:02 EDT    I have personally performed the services described in this document as scribed by the above individual and it is both accurate and complete. Yvonne Davison MD 10/20/23

## 2023-10-20 ENCOUNTER — FLU SHOT (OUTPATIENT)
Dept: FAMILY MEDICINE CLINIC | Facility: CLINIC | Age: 78
End: 2023-10-20
Payer: MEDICARE

## 2023-10-20 DIAGNOSIS — Z23 NEED FOR IMMUNIZATION AGAINST INFLUENZA: Primary | ICD-10-CM

## 2023-10-20 PROCEDURE — G0008 ADMIN INFLUENZA VIRUS VAC: HCPCS | Performed by: NURSE PRACTITIONER

## 2023-10-20 PROCEDURE — 90662 IIV NO PRSV INCREASED AG IM: CPT | Performed by: NURSE PRACTITIONER

## 2023-10-26 RX ORDER — LEVOTHYROXINE SODIUM 0.1 MG/1
100 TABLET ORAL DAILY
Qty: 20 TABLET | Refills: 0 | Status: SHIPPED | OUTPATIENT
Start: 2023-10-26

## 2023-10-26 NOTE — TELEPHONE ENCOUNTER
----- Message from Bhakti Travis sent at 10/26/2023  8:23 AM EDT -----  Regarding: I forgot my medicine  Contact: [phone]  Giuliano Mar.  I’m in [place] for 20 days and forgot my Synthroid.  Can you please call in 20 days of Levothyroxine 100 mcg as that is less expensive than the Synthroid.  The pharmacy number is  [phone]  Thanks so much  Bhakti

## 2023-11-17 ENCOUNTER — LAB (OUTPATIENT)
Dept: LAB | Facility: HOSPITAL | Age: 78
End: 2023-11-17
Payer: MEDICARE

## 2023-11-17 DIAGNOSIS — R73.01 IMPAIRED FASTING GLUCOSE: ICD-10-CM

## 2023-11-17 DIAGNOSIS — I10 ESSENTIAL HYPERTENSION: ICD-10-CM

## 2023-11-17 DIAGNOSIS — E78.2 MIXED HYPERLIPIDEMIA: ICD-10-CM

## 2023-11-17 DIAGNOSIS — N18.32 STAGE 3B CHRONIC KIDNEY DISEASE: ICD-10-CM

## 2023-11-17 DIAGNOSIS — E03.9 ACQUIRED HYPOTHYROIDISM: ICD-10-CM

## 2023-11-17 LAB
ALBUMIN SERPL-MCNC: 4.6 G/DL (ref 3.5–5.2)
ALBUMIN/GLOB SERPL: 1.6 G/DL
ALP SERPL-CCNC: 67 U/L (ref 39–117)
ALT SERPL W P-5'-P-CCNC: 48 U/L (ref 1–33)
ANION GAP SERPL CALCULATED.3IONS-SCNC: 11.3 MMOL/L (ref 5–15)
AST SERPL-CCNC: 30 U/L (ref 1–32)
BACTERIA UR QL AUTO: NORMAL /HPF
BASOPHILS # BLD AUTO: 0.04 10*3/MM3 (ref 0–0.2)
BASOPHILS NFR BLD AUTO: 0.4 % (ref 0–1.5)
BILIRUB SERPL-MCNC: 0.3 MG/DL (ref 0–1.2)
BILIRUB UR QL STRIP: NEGATIVE
BUN SERPL-MCNC: 21 MG/DL (ref 8–23)
BUN/CREAT SERPL: 22.6 (ref 7–25)
CALCIUM SPEC-SCNC: 10.6 MG/DL (ref 8.6–10.5)
CHLORIDE SERPL-SCNC: 101 MMOL/L (ref 98–107)
CLARITY UR: CLEAR
CO2 SERPL-SCNC: 26.7 MMOL/L (ref 22–29)
COLOR UR: YELLOW
CREAT SERPL-MCNC: 0.93 MG/DL (ref 0.57–1)
DEPRECATED RDW RBC AUTO: 44.4 FL (ref 37–54)
EGFRCR SERPLBLD CKD-EPI 2021: 63.4 ML/MIN/1.73
EOSINOPHIL # BLD AUTO: 0.2 10*3/MM3 (ref 0–0.4)
EOSINOPHIL NFR BLD AUTO: 2 % (ref 0.3–6.2)
ERYTHROCYTE [DISTWIDTH] IN BLOOD BY AUTOMATED COUNT: 13.6 % (ref 12.3–15.4)
GLOBULIN UR ELPH-MCNC: 2.8 GM/DL
GLUCOSE SERPL-MCNC: 121 MG/DL (ref 65–99)
GLUCOSE UR STRIP-MCNC: NEGATIVE MG/DL
HBA1C MFR BLD: 6.3 % (ref 4.8–5.6)
HCT VFR BLD AUTO: 42.2 % (ref 34–46.6)
HGB BLD-MCNC: 13.9 G/DL (ref 12–15.9)
HGB UR QL STRIP.AUTO: NEGATIVE
HOLD SPECIMEN: NORMAL
HYALINE CASTS UR QL AUTO: NORMAL /LPF
IMM GRANULOCYTES # BLD AUTO: 0.03 10*3/MM3 (ref 0–0.05)
IMM GRANULOCYTES NFR BLD AUTO: 0.3 % (ref 0–0.5)
KETONES UR QL STRIP: NEGATIVE
LEUKOCYTE ESTERASE UR QL STRIP.AUTO: ABNORMAL
LYMPHOCYTES # BLD AUTO: 3.89 10*3/MM3 (ref 0.7–3.1)
LYMPHOCYTES NFR BLD AUTO: 38.5 % (ref 19.6–45.3)
MCH RBC QN AUTO: 29.3 PG (ref 26.6–33)
MCHC RBC AUTO-ENTMCNC: 32.9 G/DL (ref 31.5–35.7)
MCV RBC AUTO: 88.8 FL (ref 79–97)
MONOCYTES # BLD AUTO: 0.58 10*3/MM3 (ref 0.1–0.9)
MONOCYTES NFR BLD AUTO: 5.7 % (ref 5–12)
NEUTROPHILS NFR BLD AUTO: 5.36 10*3/MM3 (ref 1.7–7)
NEUTROPHILS NFR BLD AUTO: 53.1 % (ref 42.7–76)
NITRITE UR QL STRIP: NEGATIVE
NRBC BLD AUTO-RTO: 0 /100 WBC (ref 0–0.2)
PH UR STRIP.AUTO: 6 [PH] (ref 5–8)
PLATELET # BLD AUTO: 204 10*3/MM3 (ref 140–450)
PMV BLD AUTO: 12.5 FL (ref 6–12)
POTASSIUM SERPL-SCNC: 4.5 MMOL/L (ref 3.5–5.2)
PROT SERPL-MCNC: 7.4 G/DL (ref 6–8.5)
PROT UR QL STRIP: NEGATIVE
RBC # BLD AUTO: 4.75 10*6/MM3 (ref 3.77–5.28)
RBC # UR STRIP: NORMAL /HPF
REF LAB TEST METHOD: NORMAL
SODIUM SERPL-SCNC: 139 MMOL/L (ref 136–145)
SP GR UR STRIP: 1.02 (ref 1–1.03)
SQUAMOUS #/AREA URNS HPF: NORMAL /HPF
T4 FREE SERPL-MCNC: 1.34 NG/DL (ref 0.93–1.7)
TSH SERPL DL<=0.05 MIU/L-ACNC: 2.81 UIU/ML (ref 0.27–4.2)
UROBILINOGEN UR QL STRIP: ABNORMAL
WBC # UR STRIP: NORMAL /HPF
WBC NRBC COR # BLD AUTO: 10.1 10*3/MM3 (ref 3.4–10.8)

## 2023-11-17 PROCEDURE — 81001 URINALYSIS AUTO W/SCOPE: CPT

## 2023-11-17 PROCEDURE — 80053 COMPREHEN METABOLIC PANEL: CPT

## 2023-11-17 PROCEDURE — 84439 ASSAY OF FREE THYROXINE: CPT

## 2023-11-17 PROCEDURE — 36415 COLL VENOUS BLD VENIPUNCTURE: CPT

## 2023-11-17 PROCEDURE — 85025 COMPLETE CBC W/AUTO DIFF WBC: CPT

## 2023-11-17 PROCEDURE — 83036 HEMOGLOBIN GLYCOSYLATED A1C: CPT

## 2023-11-17 PROCEDURE — 84443 ASSAY THYROID STIM HORMONE: CPT

## 2023-11-18 DIAGNOSIS — E78.2 MIXED HYPERLIPIDEMIA: ICD-10-CM

## 2023-11-20 RX ORDER — SIMVASTATIN 20 MG
20 TABLET ORAL NIGHTLY
Qty: 90 TABLET | Refills: 1 | Status: SHIPPED | OUTPATIENT
Start: 2023-11-20

## 2023-12-01 ENCOUNTER — OFFICE VISIT (OUTPATIENT)
Dept: FAMILY MEDICINE CLINIC | Facility: CLINIC | Age: 78
End: 2023-12-01
Payer: MEDICARE

## 2023-12-01 VITALS
DIASTOLIC BLOOD PRESSURE: 80 MMHG | SYSTOLIC BLOOD PRESSURE: 122 MMHG | OXYGEN SATURATION: 95 % | TEMPERATURE: 97.4 F | RESPIRATION RATE: 16 BRPM | BODY MASS INDEX: 36.29 KG/M2 | HEIGHT: 64 IN | HEART RATE: 73 BPM | WEIGHT: 212.6 LBS

## 2023-12-01 DIAGNOSIS — Z78.0 POSTMENOPAUSAL: Primary | ICD-10-CM

## 2023-12-01 DIAGNOSIS — E78.2 MIXED HYPERLIPIDEMIA: ICD-10-CM

## 2023-12-01 DIAGNOSIS — I10 ESSENTIAL HYPERTENSION: ICD-10-CM

## 2023-12-01 DIAGNOSIS — E66.1 CLASS 2 DRUG-INDUCED OBESITY WITH BODY MASS INDEX (BMI) OF 36.0 TO 36.9 IN ADULT, UNSPECIFIED WHETHER SERIOUS COMORBIDITY PRESENT: ICD-10-CM

## 2023-12-01 DIAGNOSIS — E03.9 ACQUIRED HYPOTHYROIDISM: ICD-10-CM

## 2023-12-01 DIAGNOSIS — R73.01 IMPAIRED FASTING GLUCOSE: ICD-10-CM

## 2023-12-01 RX ORDER — SIMVASTATIN 20 MG
20 TABLET ORAL NIGHTLY
Qty: 90 TABLET | Refills: 1 | Status: SHIPPED | OUTPATIENT
Start: 2023-12-01

## 2023-12-01 RX ORDER — AMLODIPINE AND VALSARTAN 10; 320 MG/1; MG/1
1 TABLET ORAL DAILY
Qty: 90 TABLET | Refills: 1 | Status: SHIPPED | OUTPATIENT
Start: 2023-12-01

## 2023-12-01 RX ORDER — LEVOTHYROXINE SODIUM 0.1 MG/1
100 TABLET ORAL DAILY
Qty: 20 TABLET | Refills: 0 | Status: SHIPPED | OUTPATIENT
Start: 2023-12-01

## 2023-12-01 NOTE — PROGRESS NOTES
Answers submitted by the patient for this visit:  Primary Reason for Visit (Submitted on 11/29/2023)  What is the primary reason for your visit?: Physical  Chief Complaint  Heartburn, Hyperlipidemia, Hypothyroidism, Hypertension, and Back Pain    Subjective        Bhakti Travis presents to [facility]  History of Present Illness  Hypertension:  Doing well on medication.  Elevated 122 80.    Hypothyroidism:  Doing well on medication.      Hyperlipidemia:  Doing well on medication.    Continue zocor.  Will try every other day.    Heartburn:  Would like to try pantoprazole.    Weight gain:  is concerned and would like to try to change diet and encouraged higher protein.    Heartburn  She reports no chest pain or no coughing.   Hypertension  Pertinent negatives include no chest pain, palpitations or shortness of breath.   Hypothyroidism  Pertinent negatives include no chest pain, coughing, fever, numbness or weakness.   Hyperlipidemia  Pertinent negatives include no chest pain or shortness of breath.   Back Pain  Pertinent negatives include no chest pain, fever, numbness or weakness.       The following portions of the patient's history were personally reviewed and updated as appropriate: allergies, current medications, past medical history, past surgical history, past family history, and past social history.     Body mass index is 36.47 kg/m².           Past History:    Medical History: has a past medical history of Arrhythmia, CTS (carpal tunnel syndrome), Essential hypertension, Heart murmur, High blood pressure, High cholesterol, Hyperlipemia, Hypertension, Hypothyroidism (11/13/2020), Kidney stones, Pancreatitis, PVC (premature ventricular contraction) (07/14/2021), and Sleep apnea.     Surgical History: has a past surgical history that includes Appendectomy; Back surgery; Colonoscopy; Spine surgery; and Cataract Extraction (Right, 03/04/2022).     Family History: family history  includes Breast cancer in her mother; COPD in her father; Cancer in her mother; Hyperlipidemia in her mother; Hypertension in her brother, father, mother, and sister; Stomach cancer (age of onset: 50) in her maternal grandmother.     Social History: reports that she quit smoking about 27 years ago. Her smoking use included cigarettes. She started smoking about 57 years ago. She has a 25.00 pack-year smoking history. She has never used smokeless tobacco. She reports that she does not drink alcohol and does not use drugs.    Allergies: Beta adrenergic blockers          Current Outpatient Medications:     amLODIPine-valsartan (EXFORGE)  MG per tablet, Take 1 tablet by mouth Daily., Disp: 90 tablet, Rfl: 1    levothyroxine (Synthroid) 100 MCG tablet, Take 1 tablet by mouth Daily., Disp: 20 tablet, Rfl: 0    pantoprazole (PROTONIX) 40 MG EC tablet, TAKE 1 TABLET DAILY (Patient taking differently: Take 1 tablet by mouth Daily As Needed.), Disp: 90 tablet, Rfl: 1    simvastatin (ZOCOR) 20 MG tablet, Take 1 tablet by mouth Every Night., Disp: 90 tablet, Rfl: 1    meloxicam (Mobic) 15 MG tablet, Take 1 tablet by mouth Daily. (Patient not taking: Reported on 12/1/2023), Disp: 30 tablet, Rfl: 0    Medications Discontinued During This Encounter   Medication Reason    Synthroid 100 MCG tablet Duplicate order    amLODIPine-valsartan (EXFORGE)  MG per tablet Reorder    levothyroxine (Synthroid) 100 MCG tablet Reorder    simvastatin (ZOCOR) 20 MG tablet Reorder         Review of Systems   Constitutional:  Negative for fever.   Respiratory:  Negative for cough and shortness of breath.    Cardiovascular:  Negative for chest pain, palpitations and leg swelling.   Neurological:  Negative for dizziness, weakness, numbness and headache.        Objective         Vitals:    12/01/23 0924   BP: 122/80   BP Location: Right arm   Patient Position: Sitting   Cuff Size: Large Adult   Pulse: 73   Resp: 16   Temp: 97.4 °F (36.3 °C)  "  TempSrc: Temporal   SpO2: 95%   Weight: 96.4 kg (212 lb 9.6 oz)   Height: 162.6 cm (64.02\")     Body mass index is 36.47 kg/m².         Physical Exam  Vitals reviewed.   Constitutional:       Appearance: Normal appearance. She is well-developed.   HENT:      Head: Normocephalic and atraumatic.      Mouth/Throat:      Pharynx: No oropharyngeal exudate.   Eyes:      Conjunctiva/sclera: Conjunctivae normal.      Pupils: Pupils are equal, round, and reactive to light.   Cardiovascular:      Rate and Rhythm: Normal rate and regular rhythm.      Heart sounds: Normal heart sounds. No murmur heard.     No friction rub. No gallop.   Pulmonary:      Effort: Pulmonary effort is normal.      Breath sounds: Normal breath sounds. No wheezing or rhonchi.   Skin:     General: Skin is warm and dry.   Neurological:      Mental Status: She is alert and oriented to person, place, and time.   Psychiatric:         Mood and Affect: Mood and affect normal.         Behavior: Behavior normal.         Thought Content: Thought content normal.         Judgment: Judgment normal.             Result Review :               Assessment and Plan     Diagnoses and all orders for this visit:    1. Postmenopausal (Primary)  -     DEXA Bone Density Axial; Future    2. Essential hypertension  -     amLODIPine-valsartan (EXFORGE)  MG per tablet; Take 1 tablet by mouth Daily.  Dispense: 90 tablet; Refill: 1  -     Comprehensive Metabolic Panel; Future  -     Lipid Panel With / Chol / HDL Ratio; Future  -     Urinalysis With Culture If Indicated -; Future  -     CBC & Differential; Future  -     Comprehensive Metabolic Panel; Future    3. Mixed hyperlipidemia  Comments:  continue zocor at current dose.  Orders:  -     simvastatin (ZOCOR) 20 MG tablet; Take 1 tablet by mouth Every Night.  Dispense: 90 tablet; Refill: 1    4. Acquired hypothyroidism  -     levothyroxine (Synthroid) 100 MCG tablet; Take 1 tablet by mouth Daily.  Dispense: 20 tablet; " Refill: 0  -     TSH; Future    5. Impaired fasting glucose  -     Hemoglobin A1c; Future    6. 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    Patient was given instructions and counseling regarding her condition or for health maintenance advice. Please see specific information pulled into the AVS if appropriate.

## 2023-12-07 DIAGNOSIS — E03.9 ACQUIRED HYPOTHYROIDISM: ICD-10-CM

## 2023-12-07 RX ORDER — LEVOTHYROXINE SODIUM 0.1 MG/1
100 TABLET ORAL DAILY
Qty: 90 TABLET | Refills: 1 | Status: SHIPPED | OUTPATIENT
Start: 2023-12-07

## 2023-12-07 NOTE — TELEPHONE ENCOUNTER
----- Message from Bhakti Travis sent at 12/7/2023 12:37 PM EST -----  Regarding: Pills dispensed for Levothyroxine  Contact: [phone]  Please correct and resubmit to pharm amount of pills dispensed from 20(one time order) back to 90  Thank you  Ps  also please correct on my chart   Thanks

## 2024-02-23 ENCOUNTER — TELEPHONE (OUTPATIENT)
Dept: FAMILY MEDICINE CLINIC | Facility: CLINIC | Age: 79
End: 2024-02-23
Payer: MEDICARE

## 2024-02-23 ENCOUNTER — HOSPITAL ENCOUNTER (OUTPATIENT)
Dept: GENERAL RADIOLOGY | Facility: HOSPITAL | Age: 79
Discharge: HOME | End: 2024-02-23
Payer: MEDICARE

## 2024-02-23 DIAGNOSIS — R05.1 ACUTE COUGH: ICD-10-CM

## 2024-02-23 DIAGNOSIS — R05.1 ACUTE COUGH: Primary | ICD-10-CM

## 2024-02-23 PROCEDURE — 71046 X-RAY EXAM CHEST 2 VIEWS: CPT

## 2024-02-23 NOTE — TELEPHONE ENCOUNTER
Caller: Bhakti Travis    Relationship: Self    Best call back number: [phone]    What orders are you requesting (i.e. lab or imaging): CHEST X RAY     In what timeframe would the patient need to come in: ASAP     Where will you receive your lab/imaging services: [facility]

## 2024-02-23 NOTE — TELEPHONE ENCOUNTER
Patient has been experiencing a cough for over 1 week. Patient states cough hurts muscles and she can't stop coughing. Patient requesting appointment.     Advised patient that all provider are currently booked, advised to be seen in urgent care for evaluation. Patient declined as Zaid is her PCP, advised that Zaid would still be her PCP if she was seen in . Patient states she just needs a chest x-ray and usually they just fit her in.     Patient requesting call back from [place].

## 2024-02-28 DIAGNOSIS — I10 ESSENTIAL HYPERTENSION: ICD-10-CM

## 2024-02-28 RX ORDER — AMLODIPINE AND VALSARTAN 10; 320 MG/1; MG/1
TABLET ORAL
Qty: 90 TABLET | Refills: 0 | Status: SHIPPED | OUTPATIENT
Start: 2024-02-28 | End: 2024-03-02 | Stop reason: SDUPTHER

## 2024-03-02 DIAGNOSIS — I10 ESSENTIAL HYPERTENSION: ICD-10-CM

## 2024-03-02 DIAGNOSIS — E03.9 ACQUIRED HYPOTHYROIDISM: ICD-10-CM

## 2024-03-04 RX ORDER — LEVOTHYROXINE SODIUM 0.1 MG/1
100 TABLET ORAL DAILY
Qty: 90 TABLET | Refills: 1 | Status: SHIPPED | OUTPATIENT
Start: 2024-03-04

## 2024-03-04 RX ORDER — AMLODIPINE AND VALSARTAN 10; 320 MG/1; MG/1
1 TABLET ORAL DAILY
Qty: 90 TABLET | Refills: 0 | Status: SHIPPED | OUTPATIENT
Start: 2024-03-04

## 2024-04-11 ENCOUNTER — HOSPITAL ENCOUNTER (OUTPATIENT)
Dept: BONE DENSITY | Facility: HOSPITAL | Age: 79
Discharge: HOME | End: 2024-04-11
Payer: MEDICARE

## 2024-04-11 ENCOUNTER — HOSPITAL ENCOUNTER (OUTPATIENT)
Dept: MAMMOGRAPHY | Facility: HOSPITAL | Age: 79
Discharge: HOME | End: 2024-04-11
Payer: MEDICARE

## 2024-04-11 DIAGNOSIS — Z78.0 POSTMENOPAUSAL: ICD-10-CM

## 2024-04-11 DIAGNOSIS — Z12.31 ENCOUNTER FOR SCREENING MAMMOGRAM FOR BREAST CANCER: ICD-10-CM

## 2024-04-11 PROCEDURE — 77063 BREAST TOMOSYNTHESIS BI: CPT

## 2024-04-11 PROCEDURE — 77080 DXA BONE DENSITY AXIAL: CPT

## 2024-04-11 PROCEDURE — 77067 SCR MAMMO BI INCL CAD: CPT

## 2024-04-16 ENCOUNTER — PATIENT MESSAGE (OUTPATIENT)
Dept: FAMILY MEDICINE CLINIC | Facility: CLINIC | Age: 79
End: 2024-04-16
Payer: MEDICARE

## 2024-04-17 NOTE — TELEPHONE ENCOUNTER
From: Bhakti Travis  To: Zaid Kapoor  Sent: 4/16/2024 2:30 PM EDT  Subject: Out of town 5/12 thru 7/23    Can you please write me 2 prescriptions   1. Amlotipine/valsartan 320/10. 90 days. 1 qd    2. Simvastatin 20mg 90 days. 1 qhs    In case I have trouble getting mail order I can just fill these while I’m in [place]    Also can you send labs over to outpatient lab in [facility] for August as I have appt with you on 8/22    I will  Rx’s next Monday    Thanks so much

## 2024-06-12 DIAGNOSIS — I10 ESSENTIAL HYPERTENSION: ICD-10-CM

## 2024-06-12 RX ORDER — AMLODIPINE AND VALSARTAN 10; 320 MG/1; MG/1
1 TABLET ORAL DAILY
Qty: 90 TABLET | Refills: 0 | Status: SHIPPED | OUTPATIENT
Start: 2024-06-12

## 2024-08-09 ENCOUNTER — LAB (OUTPATIENT)
Dept: LAB | Facility: HOSPITAL | Age: 79
End: 2024-08-09
Payer: MEDICARE

## 2024-08-09 DIAGNOSIS — E03.9 ACQUIRED HYPOTHYROIDISM: ICD-10-CM

## 2024-08-09 DIAGNOSIS — R73.01 IMPAIRED FASTING GLUCOSE: ICD-10-CM

## 2024-08-09 DIAGNOSIS — I10 ESSENTIAL HYPERTENSION: ICD-10-CM

## 2024-08-09 LAB
ALBUMIN SERPL-MCNC: 4.6 G/DL (ref 3.5–5.2)
ALBUMIN/GLOB SERPL: 1.5 G/DL
ALP SERPL-CCNC: 72 U/L (ref 39–117)
ALT SERPL W P-5'-P-CCNC: 25 U/L (ref 1–33)
ANION GAP SERPL CALCULATED.3IONS-SCNC: 7 MMOL/L (ref 5–15)
AST SERPL-CCNC: 21 U/L (ref 1–32)
BASOPHILS # BLD AUTO: 0.02 10*3/MM3 (ref 0–0.2)
BASOPHILS NFR BLD AUTO: 0.2 % (ref 0–1.5)
BILIRUB SERPL-MCNC: 0.3 MG/DL (ref 0–1.2)
BUN SERPL-MCNC: 19 MG/DL (ref 8–23)
BUN/CREAT SERPL: 18.4 (ref 7–25)
CALCIUM SPEC-SCNC: 10.8 MG/DL (ref 8.6–10.5)
CHLORIDE SERPL-SCNC: 104 MMOL/L (ref 98–107)
CHOLEST SERPL-MCNC: 187 MG/DL (ref 0–200)
CO2 SERPL-SCNC: 30 MMOL/L (ref 22–29)
CREAT SERPL-MCNC: 1.03 MG/DL (ref 0.57–1)
DEPRECATED RDW RBC AUTO: 45.2 FL (ref 37–54)
EGFRCR SERPLBLD CKD-EPI 2021: 55.8 ML/MIN/1.73
EOSINOPHIL # BLD AUTO: 0.28 10*3/MM3 (ref 0–0.4)
EOSINOPHIL NFR BLD AUTO: 2.7 % (ref 0.3–6.2)
ERYTHROCYTE [DISTWIDTH] IN BLOOD BY AUTOMATED COUNT: 14 % (ref 12.3–15.4)
GLOBULIN UR ELPH-MCNC: 3 GM/DL
GLUCOSE SERPL-MCNC: 126 MG/DL (ref 65–99)
HBA1C MFR BLD: 6.4 % (ref 4.8–5.6)
HCT VFR BLD AUTO: 42.4 % (ref 34–46.6)
HDLC SERPL QL: 3.22
HDLC SERPL-MCNC: 58 MG/DL (ref 40–60)
HGB BLD-MCNC: 13.7 G/DL (ref 12–15.9)
IMM GRANULOCYTES # BLD AUTO: 0.03 10*3/MM3 (ref 0–0.05)
IMM GRANULOCYTES NFR BLD AUTO: 0.3 % (ref 0–0.5)
LDLC SERPL CALC-MCNC: 115 MG/DL (ref 0–100)
LYMPHOCYTES # BLD AUTO: 2.94 10*3/MM3 (ref 0.7–3.1)
LYMPHOCYTES NFR BLD AUTO: 28.2 % (ref 19.6–45.3)
MCH RBC QN AUTO: 28.7 PG (ref 26.6–33)
MCHC RBC AUTO-ENTMCNC: 32.3 G/DL (ref 31.5–35.7)
MCV RBC AUTO: 88.9 FL (ref 79–97)
MONOCYTES # BLD AUTO: 0.57 10*3/MM3 (ref 0.1–0.9)
MONOCYTES NFR BLD AUTO: 5.5 % (ref 5–12)
NEUTROPHILS NFR BLD AUTO: 6.57 10*3/MM3 (ref 1.7–7)
NEUTROPHILS NFR BLD AUTO: 63.1 % (ref 42.7–76)
NRBC BLD AUTO-RTO: 0 /100 WBC (ref 0–0.2)
PLATELET # BLD AUTO: 198 10*3/MM3 (ref 140–450)
PMV BLD AUTO: 12.8 FL (ref 6–12)
POTASSIUM SERPL-SCNC: 4.8 MMOL/L (ref 3.5–5.2)
PROT SERPL-MCNC: 7.6 G/DL (ref 6–8.5)
RBC # BLD AUTO: 4.77 10*6/MM3 (ref 3.77–5.28)
SODIUM SERPL-SCNC: 141 MMOL/L (ref 136–145)
TRIGL SERPL-MCNC: 77 MG/DL (ref 0–150)
TSH SERPL DL<=0.05 MIU/L-ACNC: 1.71 UIU/ML (ref 0.27–4.2)
VLDLC SERPL-MCNC: 14 MG/DL (ref 5–40)
WBC NRBC COR # BLD AUTO: 10.41 10*3/MM3 (ref 3.4–10.8)

## 2024-08-09 PROCEDURE — 36415 COLL VENOUS BLD VENIPUNCTURE: CPT

## 2024-08-09 PROCEDURE — 83036 HEMOGLOBIN GLYCOSYLATED A1C: CPT

## 2024-08-09 PROCEDURE — 85025 COMPLETE CBC W/AUTO DIFF WBC: CPT

## 2024-08-09 PROCEDURE — 80061 LIPID PANEL: CPT

## 2024-08-09 PROCEDURE — 84443 ASSAY THYROID STIM HORMONE: CPT

## 2024-08-09 PROCEDURE — 80053 COMPREHEN METABOLIC PANEL: CPT

## 2024-08-22 ENCOUNTER — OFFICE VISIT (OUTPATIENT)
Dept: FAMILY MEDICINE CLINIC | Facility: CLINIC | Age: 79
End: 2024-08-22
Payer: MEDICARE

## 2024-08-22 VITALS
WEIGHT: 211.2 LBS | DIASTOLIC BLOOD PRESSURE: 80 MMHG | BODY MASS INDEX: 36.06 KG/M2 | SYSTOLIC BLOOD PRESSURE: 142 MMHG | HEART RATE: 67 BPM | HEIGHT: 64 IN | TEMPERATURE: 98.3 F | OXYGEN SATURATION: 94 %

## 2024-08-22 DIAGNOSIS — E03.9 ACQUIRED HYPOTHYROIDISM: ICD-10-CM

## 2024-08-22 DIAGNOSIS — R73.01 IMPAIRED FASTING GLUCOSE: ICD-10-CM

## 2024-08-22 DIAGNOSIS — E83.52 SERUM CALCIUM ELEVATED: ICD-10-CM

## 2024-08-22 DIAGNOSIS — R06.83 SNORING: Primary | ICD-10-CM

## 2024-08-22 DIAGNOSIS — E78.2 MIXED HYPERLIPIDEMIA: ICD-10-CM

## 2024-08-22 DIAGNOSIS — I10 ESSENTIAL HYPERTENSION: ICD-10-CM

## 2024-08-22 RX ORDER — AMLODIPINE AND VALSARTAN 10; 320 MG/1; MG/1
1 TABLET ORAL DAILY
Qty: 90 TABLET | Refills: 0 | Status: SHIPPED | OUTPATIENT
Start: 2024-08-22 | End: 2024-08-22

## 2024-08-22 RX ORDER — LEVOTHYROXINE SODIUM 0.1 MG/1
100 TABLET ORAL DAILY
Qty: 90 TABLET | Refills: 3 | Status: SHIPPED | OUTPATIENT
Start: 2024-08-22

## 2024-08-22 RX ORDER — SIMVASTATIN 20 MG
20 TABLET ORAL NIGHTLY
Qty: 90 TABLET | Refills: 3 | Status: SHIPPED | OUTPATIENT
Start: 2024-08-22

## 2024-08-22 RX ORDER — LEVOTHYROXINE SODIUM 0.1 MG/1
100 TABLET ORAL DAILY
Qty: 90 TABLET | Refills: 1 | Status: SHIPPED | OUTPATIENT
Start: 2024-08-22 | End: 2024-08-22

## 2024-08-22 RX ORDER — SIMVASTATIN 20 MG
20 TABLET ORAL NIGHTLY
Qty: 90 TABLET | Refills: 1 | Status: SHIPPED | OUTPATIENT
Start: 2024-08-22 | End: 2024-08-22

## 2024-08-22 RX ORDER — AMLODIPINE AND VALSARTAN 10; 320 MG/1; MG/1
1 TABLET ORAL DAILY
Qty: 90 TABLET | Refills: 3 | Status: SHIPPED | OUTPATIENT
Start: 2024-08-22

## 2024-08-22 NOTE — PROGRESS NOTES
Subjective   The ABCs of the Annual Wellness Visit  Medicare Wellness Visit      Bhakti Travis is a 79 y.o. patient who presents for a Medicare Wellness Visit.    The following portions of the patient's history were reviewed and   updated as appropriate: allergies, current medications, past family history, past medical history, past social history, past surgical history, and problem list.    Compared to one year ago, the patient's physical   health is the same.  Compared to one year ago, the patient's mental   health is better.    Recent Hospitalizations:  She was not admitted to the hospital during the last year.     Current Medical Providers:  Patient Care Team:  Zaid Kapoor APRN as PCP - General (Nurse Practitioner)    Outpatient Medications Prior to Visit   Medication Sig Dispense Refill    pantoprazole (PROTONIX) 40 MG EC tablet TAKE 1 TABLET DAILY 90 tablet 1    amLODIPine-valsartan (EXFORGE)  MG per tablet TAKE ONE TABLET BY MOUTH EVERY DAY 90 tablet 0    levothyroxine (Synthroid) 100 MCG tablet Take 1 tablet by mouth Daily. 90 tablet 1    simvastatin (ZOCOR) 20 MG tablet Take 1 tablet by mouth Every Night. 90 tablet 1    meloxicam (Mobic) 15 MG tablet Take 1 tablet by mouth Daily. (Patient not taking: Reported on 12/1/2023) 30 tablet 0     No facility-administered medications prior to visit.     No opioid medication identified on active medication list. I have reviewed chart for other potential  high risk medication/s and harmful drug interactions in the elderly.      Aspirin is not on active medication list.  Aspirin use is not indicated based on review of current medical condition/s. Risk of harm outweighs potential benefits.  .    Patient Active Problem List   Diagnosis    Essential hypertension    Hyperlipemia    Hypothyroidism    Kidney stones    Pancreatitis    Impaired fasting glucose    PVC (premature ventricular contraction)    SOB (shortness of breath)    Trigger finger of right hand  "   Trigger ring finger of right hand    Vulvar cyst    Sebaceous cyst    Genitourinary syndrome of menopause     Advance Care Planning Advance Directive is not on file.  ACP discussion was held with the patient during this visit. Patient does not have an advance directive, declines further assistance.            Objective   Vitals:    24 1402   BP: 142/80   BP Location: Left arm   Patient Position: Sitting   Cuff Size: Large Adult   Pulse: 67   Temp: 98.3 °F (36.8 °C)   SpO2: 94%   Weight: 95.8 kg (211 lb 3.2 oz)   Height: 162.6 cm (64.02\")       Estimated body mass index is 36.23 kg/m² as calculated from the following:    Height as of this encounter: 162.6 cm (64.02\").    Weight as of this encounter: 95.8 kg (211 lb 3.2 oz).            Does the patient have evidence of cognitive impairment? No  Lab Results   Component Value Date    TRIG 77 2024    HDL 58 2024     (H) 2024    VLDL 14 2024    HGBA1C 6.40 (H) 2024                                                                                                Health  Risk Assessment    Smoking Status:  Social History     Tobacco Use   Smoking Status Former    Current packs/day: 0.00    Average packs/day: 1 pack/day for 29.8 years (29.8 ttl pk-yrs)    Types: Cigarettes    Start date: 3/1/1966    Quit date: 1995    Years since quittin.6    Passive exposure: Past   Smokeless Tobacco Never   Tobacco Comments    quit 25 years ago     Alcohol Consumption:  Social History     Substance and Sexual Activity   Alcohol Use Never       Fall Risk Screen  STEADI Fall Risk Assessment was completed, and patient is at LOW risk for falls.Assessment completed on:2024    Depression Screenin/22/2024     2:04 PM   PHQ-2/PHQ-9 Depression Screening   Little Interest or Pleasure in Doing Things 0-->not at all   Feeling Down, Depressed or Hopeless 0-->not at all   PHQ-9: Brief Depression Severity Measure Score 0     Health " Habits and Functional and Cognitive Screenin/22/2024     2:00 PM   Functional & Cognitive Status   Do you have difficulty preparing food and eating? No   Do you have difficulty bathing yourself, getting dressed or grooming yourself? No   Do you have difficulty using the toilet? No   Do you have difficulty moving around from place to place? No   Do you have trouble with steps or getting out of a bed or a chair? No   Current Diet Well Balanced Diet   Dental Exam Up to date   Eye Exam Up to date   Exercise (times per week) 7 times per week   Current Exercises Include House Cleaning   Do you need help using the phone?  No   Are you deaf or do you have serious difficulty hearing?  No   Do you need help to go to places out of walking distance? No   Do you need help shopping? No   Do you need help preparing meals?  No   Do you need help with housework?  No   Do you need help with laundry? No   Do you need help taking your medications? No   Do you need help managing money? No   Do you ever drive or ride in a car without wearing a seat belt? No   Have you felt unusual stress, anger or loneliness in the last month? No   Who do you live with? Alone   If you need help, do you have trouble finding someone available to you? No   Have you been bothered in the last four weeks by sexual problems? No   Do you have difficulty concentrating, remembering or making decisions? No           Age-appropriate Screening Schedule:  Refer to the list below for future screening recommendations based on patient's age, sex and/or medical conditions. Orders for these recommended tests are listed in the plan section. The patient has been provided with a written plan.    Health Maintenance List  Health Maintenance   Topic Date Due    INFLUENZA VACCINE  2024    RSV Vaccine - Adults (1 - 1-dose 60+ series) 2024 (Originally 3/19/2006)    COVID-19 Vaccine (2023-24 season) 2024 (Originally 2023)    ZOSTER VACCINE (2 of 2)  09/15/2043 (Originally 12/10/2019)    BMI FOLLOWUP  10/19/2024    LIPID PANEL  08/09/2025    ANNUAL WELLNESS VISIT  08/22/2025    DXA SCAN  04/11/2026    TDAP/TD VACCINES (2 - Td or Tdap) 04/12/2031    HEPATITIS C SCREENING  Completed    Pneumococcal Vaccine 65+  Completed    COLORECTAL CANCER SCREENING  Discontinued                                                                                                                                                CMS Preventative Services Quick Reference  Risk Factors Identified During Encounter  None Identified  Gait is steady in the office not needing to walk with assistance.    The above risks/problems have been discussed with the patient.  Pertinent information has been shared with the patient in the After Visit Summary.  An After Visit Summary and PPPS were made available to the patient.    Follow Up:   Next Medicare Wellness visit to be scheduled in 1 year.         Additional E&M Note during same encounter follows:  Patient has additional, significant, and separately identifiable condition(s)/problem(s) that require work above and beyond the Medicare Wellness Visit     Chief Complaint  Medicare Wellness-subsequent, Hypertension, Heartburn, Hyperlipidemia, and Hypothyroidism    Subjective   Hypertension:  Doing well on medication.  Elevated 142/80.    Hypothyroidism:  Doing well on medication.      Hyperlipidemia:  Doing well on medication.    Continue zocor.  Will try every other day.    Heartburn:  Would like to try pantoprazole.    Weight gain:  is concerned and would like to try to change diet and encouraged higher protein.  But has some constipation when she travels since she is not able to be on     Hypertension  Pertinent negatives include no chest pain, headaches or shortness of breath.   Heartburn  She reports no chest pain or no coughing.   Hyperlipidemia  Exacerbating diseases include hypothyroidism. Pertinent negatives include no chest pain or shortness  "of breath.   Hypothyroidism  Pertinent negatives include no chest pain, coughing, fever or headaches.         Review of Systems   Constitutional:  Negative for fever.   Respiratory:  Negative for cough, chest tightness and shortness of breath.    Cardiovascular:  Negative for chest pain.              Objective   Vital Signs:  /80 (BP Location: Left arm, Patient Position: Sitting, Cuff Size: Large Adult)   Pulse 67   Temp 98.3 °F (36.8 °C)   Ht 162.6 cm (64.02\")   Wt 95.8 kg (211 lb 3.2 oz)   SpO2 94%   BMI 36.23 kg/m²   Physical Exam  Vitals reviewed.   Constitutional:       Appearance: Normal appearance. She is well-developed.   HENT:      Head: Normocephalic and atraumatic.      Mouth/Throat:      Pharynx: No oropharyngeal exudate.   Eyes:      Conjunctiva/sclera: Conjunctivae normal.      Pupils: Pupils are equal, round, and reactive to light.   Cardiovascular:      Rate and Rhythm: Normal rate and regular rhythm.      Heart sounds: Normal heart sounds. No murmur heard.     No friction rub. No gallop.   Pulmonary:      Effort: Pulmonary effort is normal.      Breath sounds: Normal breath sounds. No wheezing or rhonchi.   Skin:     General: Skin is warm and dry.   Neurological:      Mental Status: She is alert and oriented to person, place, and time.   Psychiatric:         Mood and Affect: Mood and affect normal.         Behavior: Behavior normal.         Thought Content: Thought content normal.         Judgment: Judgment normal.                 Assessment and Plan               Acquired hypothyroidism    Mixed hyperlipidemia     Essential hypertension    Snoring    Impaired fasting glucose    Serum calcium elevated      Orders Placed This Encounter   Procedures    Comprehensive Metabolic Panel     Standing Status:   Future     Standing Expiration Date:   8/22/2025     Order Specific Question:   Release to patient     Answer:   Routine Release [[id number]]    Hemoglobin A1c     Standing Status:   " Future     Standing Expiration Date:   8/22/2025     Order Specific Question:   Release to patient     Answer:   Routine Release [[id number]]    Urinalysis With Culture If Indicated -     Standing Status:   Future     Standing Expiration Date:   8/22/2025     Order Specific Question:   Release to patient     Answer:   Routine Release [[id number]]    Lipid Panel     Standing Status:   Future     Standing Expiration Date:   8/22/2025     Order Specific Question:   Release to patient     Answer:   Routine Release [[id number]]    PTH, Intact     Standing Status:   Future     Standing Expiration Date:   8/22/2025     Order Specific Question:   Release to patient     Answer:   Routine Release [[id number]]    Ambulatory Referral to Sleep Medicine     Referral Priority:   Routine     Referral Type:   Consultation     Number of Visits Requested:   1    Overnight Sleep Oximetry Study     Standing Status:   Future     Standing Expiration Date:   8/22/2025    CBC & Differential     Standing Status:   Future     Standing Expiration Date:   8/22/2025     Order Specific Question:   Manual Differential     Answer:   No     Order Specific Question:   Release to patient     Answer:   Routine Release [[id number]]     New Medications Ordered This Visit   Medications    simvastatin (ZOCOR) 20 MG tablet     Sig: Take 1 tablet by mouth Every Night.     Dispense:  90 tablet     Refill:  3    levothyroxine (Synthroid) 100 MCG tablet     Sig: Take 1 tablet by mouth Daily.     Dispense:  90 tablet     Refill:  3    amLODIPine-valsartan (EXFORGE)  MG per tablet     Sig: Take 1 tablet by mouth Daily.     Dispense:  90 tablet     Refill:  3          Follow Up   Return in about 6 months (around 2/22/2025).  Patient was given instructions and counseling regarding her condition or for health maintenance advice. Please see specific information pulled into the AVS if appropriate.

## 2024-08-23 ENCOUNTER — PATIENT MESSAGE (OUTPATIENT)
Dept: FAMILY MEDICINE CLINIC | Facility: CLINIC | Age: 79
End: 2024-08-23
Payer: MEDICARE

## 2024-08-26 NOTE — TELEPHONE ENCOUNTER
From: Bhakti Travis  To: Zaid Kapoor  Sent: 8/23/2024 11:56 AM EDT  Subject: Pulse Ox study    Do you have any idea when I can expect call from oximetry company to  machine??? Thanks

## 2024-09-16 NOTE — PROGRESS NOTES
Lvm for pt with recommendations of a new cardiologist per     The ABCs of the Annual Wellness Visit  Subsequent Medicare Wellness Visit    Chief Complaint   Patient presents with   • Medicare Wellness-subsequent   • Hypertension     f/u       Subjective   History of Present Illness:  Bhakti Travis is a 76 y.o. female who presents for a Subsequent Medicare Wellness Visit.  Also having uncontrolled hypertension.  Blood pressure is better today for her at 150/86.  She states she hasn't had chest pain or shortness of breath except occasional deep breath she gets with what she has determined is anxiety.  Did have CHF once 3-4 years ago but has had nothing since.  Blood pressures still running in the 180's.  Has been limiting salt and is trying to lose weight.    HEALTH RISK ASSESSMENT    Recent Hospitalizations:  No hospitalization(s) within the last year.    Current Medical Providers:  Patient Care Team:  Zaid Kapoor APRN as PCP - General (Nurse Practitioner)    Smoking Status:  Social History     Tobacco Use   Smoking Status Former Smoker   • Years: 1.00   Smokeless Tobacco Never Used   Tobacco Comment    quit 25 years ago       Alcohol Consumption:  Social History     Substance and Sexual Activity   Alcohol Use Never       Depression Screen:   PHQ-2/PHQ-9 Depression Screening 6/21/2021   Little interest or pleasure in doing things 0   Feeling down, depressed, or hopeless 0   Total Score 0       Fall Risk Screen:  STEADI Fall Risk Assessment was completed, and patient is at LOW risk for falls.Assessment completed on:6/21/2021    Health Habits and Functional and Cognitive Screening:  Functional & Cognitive Status 6/21/2021   Do you have difficulty preparing food and eating? No   Do you have difficulty bathing yourself, getting dressed or grooming yourself? No   Do you have difficulty using the toilet? No   Do you have difficulty moving around from place to place? No   Do you have trouble with steps or getting out of a bed or a chair? No   Current Diet Well Balanced Diet    Dental Exam Up to date   Eye Exam Up to date   Exercise (times per week) 3 times per week   Current Exercises Include Walking   Do you need help using the phone?  No   Are you deaf or do you have serious difficulty hearing?  No   Do you need help with transportation? No   Do you need help shopping? No   Do you need help preparing meals?  No   Do you need help with housework?  No   Do you need help with laundry? No   Do you need help taking your medications? No   Do you need help managing money? No   Do you ever drive or ride in a car without wearing a seat belt? No   Have you felt unusual stress, anger or loneliness in the last month? Yes   Who do you live with? Alone   If you need help, do you have trouble finding someone available to you? Yes   Have you been bothered in the last four weeks by sexual problems? No   Do you have difficulty concentrating, remembering or making decisions? No         Does the patient have evidence of cognitive impairment? No    Asprin use counseling:Start ASA 81 mg daily     Age-appropriate Screening Schedule:  Refer to the list below for future screening recommendations based on patient's age, sex and/or medical conditions. Orders for these recommended tests are listed in the plan section. The patient has been provided with a written plan.    Health Maintenance   Topic Date Due   • DXA SCAN  Never done   • TDAP/TD VACCINES (1 - Tdap) Never done   • ZOSTER VACCINE (1 of 2) Never done   • INFLUENZA VACCINE  08/01/2021   • LIPID PANEL  04/30/2022          The following portions of the patient's history were reviewed and updated as appropriate: allergies, current medications, past family history, past medical history, past social history, past surgical history and problem list.    Outpatient Medications Prior to Visit   Medication Sig Dispense Refill   • cloNIDine (CATAPRES) 0.1 MG tablet clonidine HCl 0.1 mg oral tablet take 1 tablet by oral route daily as needed 5/19/2021  Active     •  "levothyroxine (SYNTHROID) 100 mcg.     • losartan (COZAAR) 50 MG tablet      • metoprolol succinate XL (Toprol XL) 25 MG 24 hr tablet Take 1 tablet by mouth Daily. 30 tablet 1   • simvastatin (ZOCOR) 20 MG tablet        No facility-administered medications prior to visit.       Patient Active Problem List   Diagnosis   • Essential hypertension   • Hyperlipemia   • Hypothyroidism   • Kidney stones   • Pancreatitis   • Impaired fasting glucose       Advanced Care Planning:  ACP discussion was held with the patient during this visit. Patient has an advance directive (not in EMR), copy requested.    Review of Systems   Constitutional: Negative for appetite change, fatigue and fever.   HENT: Negative for congestion and hearing loss.    Eyes: Negative for visual disturbance.   Respiratory: Negative for cough, shortness of breath and wheezing.    Cardiovascular: Negative for chest pain and palpitations.   Gastrointestinal: Negative for abdominal pain, constipation, diarrhea and nausea.   Musculoskeletal: Negative for arthralgias and myalgias.   Skin: Negative for rash.   Neurological: Negative for weakness and numbness.   Psychiatric/Behavioral: Negative for confusion, decreased concentration, dysphoric mood, sleep disturbance and suicidal ideas. The patient is not nervous/anxious.        Compared to one year ago, the patient feels her physical health is the same.  Compared to one year ago, the patient feels her mental health is better.    Reviewed chart for potential of high risk medication in the elderly: no  Reviewed chart for potential of harmful drug interactions in the elderly:no    Objective         Vitals:    06/21/21 0926   BP: 150/86   BP Location: Right arm   Patient Position: Sitting   Cuff Size: Adult   Pulse: 61   Resp: 16   Temp: 96.9 °F (36.1 °C)   TempSrc: Infrared   SpO2: 94%   Weight: 90.3 kg (199 lb)   Height: 162.6 cm (64\")   PainSc: 0-No pain       Body mass index is 34.16 kg/m².  Discussed the " patient's BMI with her. The BMI is below average; BMI management plan is completed.    Physical Exam  Vitals reviewed.   Constitutional:       Appearance: Normal appearance. She is well-developed.   HENT:      Head: Normocephalic and atraumatic.      Mouth/Throat:      Pharynx: No oropharyngeal exudate.   Eyes:      Conjunctiva/sclera: Conjunctivae normal.      Pupils: Pupils are equal, round, and reactive to light.   Cardiovascular:      Rate and Rhythm: Normal rate and regular rhythm.      Heart sounds: No murmur heard.   No friction rub. No gallop.    Pulmonary:      Effort: Pulmonary effort is normal.      Breath sounds: Normal breath sounds. No wheezing or rhonchi.   Skin:     General: Skin is warm and dry.   Neurological:      Mental Status: She is alert and oriented to person, place, and time.   Psychiatric:         Mood and Affect: Mood and affect normal.         Behavior: Behavior normal.         Thought Content: Thought content normal.         Judgment: Judgment normal.         Lab Results   Component Value Date     (H) 05/12/2021    CHLPL 172 04/30/2021    TRIG 95 04/30/2021    HDL 59 04/30/2021    LDL 94 04/30/2021    VLDL 19 04/30/2021    HGBA1C 6.2 (H) 05/12/2021        Assessment/Plan   Medicare Risks and Personalized Health Plan  CMS Preventative Services Quick Reference  Cardiovascular risk    The above risks/problems have been discussed with the patient.  Pertinent information has been shared with the patient in the After Visit Summary.  Follow up plans and orders are seen below in the Assessment/Plan Section.    Diagnoses and all orders for this visit:    1. Uncontrolled hypertension (Primary)  -     Comprehensive Metabolic Panel; Future    2. Impaired fasting glucose    has labs in for further testing of kidneys she is aware she needs to get as well as a test for kidney stenosis.   Encouraged her to take her blood pressure cuff as she goes to work and keep a journal.  Follow Up:  Return in  about 1 month (around 7/21/2021) for Next scheduled follow up.     An After Visit Summary and PPPS were given to the patient.

## 2024-12-12 ENCOUNTER — CLINICAL SUPPORT (OUTPATIENT)
Dept: FAMILY MEDICINE CLINIC | Facility: CLINIC | Age: 79
End: 2024-12-12
Payer: MEDICARE

## 2024-12-12 VITALS — DIASTOLIC BLOOD PRESSURE: 84 MMHG | SYSTOLIC BLOOD PRESSURE: 128 MMHG

## 2024-12-12 DIAGNOSIS — I10 ESSENTIAL HYPERTENSION: Primary | ICD-10-CM

## 2025-01-13 ENCOUNTER — TELEPHONE (OUTPATIENT)
Dept: FAMILY MEDICINE CLINIC | Facility: CLINIC | Age: 80
End: 2025-01-13
Payer: MEDICARE

## 2025-01-13 DIAGNOSIS — Z12.31 ENCOUNTER FOR SCREENING MAMMOGRAM FOR MALIGNANT NEOPLASM OF BREAST: Primary | ICD-10-CM

## 2025-01-13 NOTE — TELEPHONE ENCOUNTER
Patient spoke to monica about a Mammo order. She declined at first but wants to go ahead with it. Patient will be gone feb 2nd to [facility]. Would prefer feb 7th if possible.